# Patient Record
Sex: FEMALE | Race: WHITE | NOT HISPANIC OR LATINO | Employment: UNEMPLOYED | URBAN - NONMETROPOLITAN AREA
[De-identification: names, ages, dates, MRNs, and addresses within clinical notes are randomized per-mention and may not be internally consistent; named-entity substitution may affect disease eponyms.]

---

## 2017-07-18 ENCOUNTER — OFFICE VISIT (OUTPATIENT)
Dept: CARDIOLOGY | Facility: CLINIC | Age: 47
End: 2017-07-18

## 2017-07-18 VITALS
BODY MASS INDEX: 40.87 KG/M2 | OXYGEN SATURATION: 98 % | HEART RATE: 95 BPM | SYSTOLIC BLOOD PRESSURE: 157 MMHG | HEIGHT: 67 IN | WEIGHT: 260.4 LBS | DIASTOLIC BLOOD PRESSURE: 92 MMHG

## 2017-07-18 DIAGNOSIS — R07.9 CHEST PAIN, UNSPECIFIED TYPE: Primary | ICD-10-CM

## 2017-07-18 DIAGNOSIS — R06.02 SHORTNESS OF BREATH: ICD-10-CM

## 2017-07-18 DIAGNOSIS — I10 ESSENTIAL HYPERTENSION: ICD-10-CM

## 2017-07-18 PROCEDURE — 93000 ELECTROCARDIOGRAM COMPLETE: CPT | Performed by: PHYSICIAN ASSISTANT

## 2017-07-18 PROCEDURE — 99214 OFFICE O/P EST MOD 30 MIN: CPT | Performed by: PHYSICIAN ASSISTANT

## 2017-07-18 RX ORDER — BUPROPION HYDROCHLORIDE 150 MG/1
150 TABLET, EXTENDED RELEASE ORAL EVERY OTHER DAY
COMMUNITY
End: 2019-06-06

## 2017-07-18 RX ORDER — LEVOTHYROXINE SODIUM 0.1 MG/1
112 TABLET ORAL DAILY
COMMUNITY
Start: 2017-07-13

## 2017-07-18 RX ORDER — BUPROPION HYDROCHLORIDE 150 MG/1
TABLET ORAL DAILY
COMMUNITY
Start: 2017-07-17 | End: 2017-07-18

## 2017-07-18 RX ORDER — HYDROCHLOROTHIAZIDE 25 MG/1
25 TABLET ORAL DAILY
COMMUNITY
Start: 2017-07-13 | End: 2019-06-06

## 2017-07-18 RX ORDER — NITROGLYCERIN 0.4 MG/1
TABLET SUBLINGUAL
Qty: 100 TABLET | Refills: 11 | Status: SHIPPED | OUTPATIENT
Start: 2017-07-18 | End: 2019-06-06

## 2017-07-18 RX ORDER — AMITRIPTYLINE HYDROCHLORIDE 25 MG/1
TABLET, FILM COATED ORAL DAILY
COMMUNITY
Start: 2017-05-31 | End: 2017-07-18

## 2017-07-18 RX ORDER — POTASSIUM CHLORIDE 750 MG/1
10 CAPSULE, EXTENDED RELEASE ORAL DAILY
COMMUNITY
Start: 2017-06-21

## 2017-07-18 RX ORDER — IBUPROFEN 800 MG/1
TABLET ORAL 3 TIMES DAILY
COMMUNITY
Start: 2017-07-13 | End: 2019-06-06

## 2017-07-18 RX ORDER — FUROSEMIDE 20 MG/1
20 TABLET ORAL DAILY
COMMUNITY
Start: 2017-06-21 | End: 2019-07-30 | Stop reason: SDUPTHER

## 2017-07-18 RX ORDER — QUETIAPINE FUMARATE 25 MG/1
TABLET, FILM COATED ORAL DAILY
COMMUNITY
Start: 2017-07-11 | End: 2017-07-18

## 2017-07-18 RX ORDER — CLONAZEPAM 1 MG/1
TABLET ORAL DAILY
COMMUNITY
Start: 2017-05-02 | End: 2017-07-18

## 2017-07-18 NOTE — PROGRESS NOTES
Problem list     Subjective   Andres Cortez is a 47 y.o. female     Chief Complaint   Patient presents with   • Chest Pain     patient appears in office today for frequent episodes of CP ; patient states it starts in center of chest and radiates into LUE       HPI    Patient is a 47-year-old female that presents for evaluation.  She is been complaining of intermittent episodes of chest discomfort.  She describes to me that she has history of panic attacks.  She describes one day in particular that she developed discomfort.  She describes a pressure and heavy sensation with subsequent pain radiating down her left upper extremity lasting for several minutes.  Eventually resolves.  She describes moderate levels of shortness of breath when trying to exert do activity.  She denies PND orthopnea.  She does feel palpitations but denies dizziness presyncope or syncope.  Otherwise voices no complaints      Outpatient Encounter Prescriptions as of 7/18/2017   Medication Sig Dispense Refill   • aspirin 81 MG tablet Take 81 mg by mouth Daily.     • buPROPion SR (WELLBUTRIN SR) 150 MG 12 hr tablet Take 150 mg by mouth Daily.     • busPIRone (BUSPAR) 15 MG tablet Take 15 mg by mouth Daily.     • Cholecalciferol (VITAMIN D3) 08749 UNITS capsule Take 1 capsule by mouth every 7 days.     • furosemide (LASIX) 20 MG tablet 20 mg Daily.     • hydrochlorothiazide (HYDRODIURIL) 25 MG tablet 25 mg Daily.     • ibuprofen (ADVIL,MOTRIN) 800 MG tablet 3 (Three) Times a Day.     • levothyroxine (SYNTHROID, LEVOTHROID) 75 MCG tablet 75 mcg Daily.     • metoprolol tartrate (LOPRESSOR) 50 MG tablet TAKE ONE TABLET BY MOUTH THREE TIMES A DAY 90 tablet 5   • nitroglycerin (NITROSTAT) 0.4 MG SL tablet Place 5 tablets under the tongue every 5 (five) minutes as needed. Place 1 tablet under the tongue every 5 minutes for up to 3 doses as needed for chest pain. If pain persists, call 911     • potassium chloride (MICRO-K) 10 MEQ CR capsule 10 mEq  Daily.     • sertraline (ZOLOFT) 100 MG tablet Take 100 mg by mouth Daily.     • nitroglycerin (NITROSTAT) 0.4 MG SL tablet 1 under the tongue as needed for angina, may repeat q5mins for up three doses 100 tablet 11   • [DISCONTINUED] amitriptyline (ELAVIL) 25 MG tablet Daily.     • [DISCONTINUED] amLODIPine (NORVASC) 5 MG tablet Take 1 tablet by mouth daily.     • [DISCONTINUED] buPROPion XL (WELLBUTRIN XL) 150 MG 24 hr tablet Daily.     • [DISCONTINUED] clonazePAM (KlonoPIN) 1 MG tablet Daily.     • [DISCONTINUED] fexofenadine (ALLEGRA ALLERGY) 180 MG tablet Take 1 tablet by mouth daily.     • [DISCONTINUED] fluticasone (FLONASE) 50 MCG/ACT nasal spray into each nostril.     • [DISCONTINUED] hydrochlorothiazide (MICROZIDE) 12.5 MG capsule Take 25 mg by mouth daily.     • [DISCONTINUED] lisinopril (PRINIVIL,ZESTRIL) 40 MG tablet Take 1 tablet by mouth daily.     • [DISCONTINUED] QUEtiapine (SEROquel) 25 MG tablet Daily.       No facility-administered encounter medications on file as of 7/18/2017.        Review of patient's allergies indicates no known allergies.    Past Medical History:   Diagnosis Date   • Anxiety    • Depression    • Hypertension    • Panic attacks        Social History     Social History   • Marital status:      Spouse name: N/A   • Number of children: N/A   • Years of education: N/A     Occupational History   • Not on file.     Social History Main Topics   • Smoking status: Former Smoker   • Smokeless tobacco: Never Used   • Alcohol use No   • Drug use: No   • Sexual activity: Defer     Other Topics Concern   • Not on file     Social History Narrative       Family History   Problem Relation Age of Onset   • Stroke Mother    • Heart attack Mother    • Heart failure Father    • Diabetes Father    • Heart failure Brother        Review of Systems   Constitutional: Negative.  Negative for fatigue.   HENT: Negative.    Eyes: Positive for visual disturbance (wears reading glasses).  "  Respiratory: Positive for chest tightness (occasional chest tightness; due to anxiety) and shortness of breath (on exertion). Negative for cough and wheezing.    Cardiovascular: Positive for chest pain (occasional CP; starts in center of chest and radiates into LUE; pt doesnt know if it is anxiety relate jossy not), palpitations (occasional flutters) and leg swelling (BLE swelling ; LLE worse than RLE).   Gastrointestinal: Negative.  Negative for abdominal pain, nausea and vomiting.   Endocrine: Negative.  Negative for cold intolerance, heat intolerance, polyphagia and polyuria.   Genitourinary: Negative.  Negative for difficulty urinating, frequency and urgency.   Musculoskeletal: Negative.  Negative for arthralgias, back pain, myalgias, neck pain and neck stiffness.   Skin: Negative.  Negative for rash and wound.   Allergic/Immunologic: Negative.  Negative for environmental allergies and food allergies.   Neurological: Negative.  Negative for dizziness, weakness, light-headedness and headaches.   Hematological: Negative.  Does not bruise/bleed easily.   Psychiatric/Behavioral: Positive for agitation (easily agitated) and confusion (easily confused). Negative for sleep disturbance. The patient is nervous/anxious (H/O anxiety disorder).        Objective     /92 (BP Location: Left arm, Patient Position: Sitting)  Pulse 95  Ht 67\" (170.2 cm)  Wt 260 lb 6.4 oz (118 kg)  SpO2 98%  BMI 40.78 kg/m2    Lab Results (most recent)     None          Physical Exam   Constitutional: She is oriented to person, place, and time. She appears well-developed and well-nourished. No distress.   HENT:   Head: Normocephalic and atraumatic.   Eyes: EOM are normal. Pupils are equal, round, and reactive to light.   Neck: No JVD present.   Cardiovascular: Normal rate, regular rhythm and normal heart sounds.  Exam reveals no gallop and no friction rub.    No murmur heard.  Pulmonary/Chest: Effort normal and breath sounds normal. No " respiratory distress. She has no wheezes. She has no rales. She exhibits no tenderness.   Abdominal: Soft.   Musculoskeletal: Normal range of motion. She exhibits no edema.   Neurological: She is alert and oriented to person, place, and time. No cranial nerve deficit.   Skin: Skin is warm and dry. No rash noted. No erythema. No pallor.   Psychiatric: She has a normal mood and affect. Her behavior is normal.   Nursing note and vitals reviewed.      Procedure     ECG 12 Lead  Date/Time: 7/18/2017 10:38 AM  Performed by: MARIA M BALDWIN  Authorized by: MARIA M BALDWIN   Comments:     EKG demonstrates sinus rhythm at 91 bpm, normal axis, early precordial R-wave progression, no acute ST changes               Assessment/Plan     Problems Addressed this Visit        Cardiovascular and Mediastinum    Essential hypertension    Relevant Medications    furosemide (LASIX) 20 MG tablet    hydrochlorothiazide (HYDRODIURIL) 25 MG tablet    Other Relevant Orders    Stress Test With Myocardial Perfusion One Day    Adult Transthoracic Echo Complete       Respiratory    Shortness of breath    Relevant Orders    Stress Test With Myocardial Perfusion One Day    Adult Transthoracic Echo Complete       Nervous and Auditory    Chest pain - Primary    Relevant Orders    ECG 12 Lead    Stress Test With Myocardial Perfusion One Day    Adult Transthoracic Echo Complete              Recommendations  1.  Because of symptom of chest pain with symptoms concerning for ischemia, we'll schedule for stress test and echocardiogram.  We are prescribing nitroglycerin as needed for chest discomfort.  Would like to see her back for follow-up of above testing.  Follow-up primary as scheduled

## 2017-08-10 ENCOUNTER — OUTSIDE FACILITY SERVICE (OUTPATIENT)
Dept: CARDIOLOGY | Facility: CLINIC | Age: 47
End: 2017-08-10

## 2017-08-10 ENCOUNTER — HOSPITAL ENCOUNTER (OUTPATIENT)
Dept: CARDIOLOGY | Facility: HOSPITAL | Age: 47
Discharge: HOME | End: 2017-08-10

## 2017-08-10 LAB
MAXIMAL PREDICTED HEART RATE: 172 BPM
STRESS TARGET HR: 146 BPM

## 2017-08-10 PROCEDURE — 93018 CV STRESS TEST I&R ONLY: CPT | Performed by: INTERNAL MEDICINE

## 2017-08-10 PROCEDURE — 93306 TTE W/DOPPLER COMPLETE: CPT | Performed by: INTERNAL MEDICINE

## 2017-08-10 PROCEDURE — 93017 CV STRESS TEST TRACING ONLY: CPT

## 2017-08-10 PROCEDURE — 25010000002 REGADENOSON 0.4 MG/5ML SOLUTION: Performed by: INTERNAL MEDICINE

## 2017-08-10 PROCEDURE — 78452 HT MUSCLE IMAGE SPECT MULT: CPT | Performed by: INTERNAL MEDICINE

## 2017-08-10 PROCEDURE — 0 TECHNETIUM SESTAMIBI: Performed by: INTERNAL MEDICINE

## 2017-08-10 PROCEDURE — 78452 HT MUSCLE IMAGE SPECT MULT: CPT

## 2017-08-10 PROCEDURE — 93306 TTE W/DOPPLER COMPLETE: CPT

## 2017-08-10 PROCEDURE — A9500 TC99M SESTAMIBI: HCPCS | Performed by: INTERNAL MEDICINE

## 2017-08-10 RX ADMIN — TECHNETIUM TC-99M SESTAMIBI 1 DOSE: 1 INJECTION INTRAVENOUS at 08:45

## 2017-08-10 RX ADMIN — REGADENOSON 0.4 MG: 0.08 INJECTION, SOLUTION INTRAVENOUS at 08:45

## 2017-08-16 ENCOUNTER — DOCUMENTATION (OUTPATIENT)
Dept: CARDIOLOGY | Facility: CLINIC | Age: 47
End: 2017-08-16

## 2017-08-16 NOTE — PROGRESS NOTES
Stress test and echo results back in the office, 1-2 week f/u recommended per stress test results. Message's have been left twice at [phone] number with no return call. Tried calling [phone] also this am and v/m box was not set up. Results given to KORIN Tran to mail results and appt. FLORY SHIRLEY

## 2017-08-23 ENCOUNTER — OFFICE VISIT (OUTPATIENT)
Dept: CARDIOLOGY | Facility: CLINIC | Age: 47
End: 2017-08-23

## 2017-08-23 VITALS
OXYGEN SATURATION: 97 % | HEART RATE: 82 BPM | WEIGHT: 257.8 LBS | BODY MASS INDEX: 40.46 KG/M2 | HEIGHT: 67 IN | DIASTOLIC BLOOD PRESSURE: 88 MMHG | SYSTOLIC BLOOD PRESSURE: 138 MMHG

## 2017-08-23 DIAGNOSIS — R94.39 ABNORMAL STRESS TEST: ICD-10-CM

## 2017-08-23 DIAGNOSIS — R60.0 EDEMA OF LOWER EXTREMITY, UNSPECIFIED LATERALITY: ICD-10-CM

## 2017-08-23 DIAGNOSIS — R06.02 SHORTNESS OF BREATH: ICD-10-CM

## 2017-08-23 DIAGNOSIS — R07.9 CHEST PAIN, UNSPECIFIED TYPE: Primary | ICD-10-CM

## 2017-08-23 PROCEDURE — 99214 OFFICE O/P EST MOD 30 MIN: CPT | Performed by: PHYSICIAN ASSISTANT

## 2017-08-23 RX ORDER — ISOSORBIDE MONONITRATE 30 MG/1
30 TABLET, EXTENDED RELEASE ORAL EVERY MORNING
Qty: 30 TABLET | Refills: 11 | Status: SHIPPED | OUTPATIENT
Start: 2017-08-23 | End: 2019-06-06

## 2017-08-23 RX ORDER — CLOPIDOGREL BISULFATE 75 MG/1
75 TABLET ORAL DAILY
Qty: 30 TABLET | Refills: 11 | Status: SHIPPED | OUTPATIENT
Start: 2017-08-23 | End: 2019-06-06

## 2017-08-23 RX ORDER — ATORVASTATIN CALCIUM 40 MG/1
40 TABLET, FILM COATED ORAL DAILY
Qty: 30 TABLET | Refills: 11 | Status: SHIPPED | OUTPATIENT
Start: 2017-08-23 | End: 2019-06-06

## 2017-08-23 NOTE — PROGRESS NOTES
Problem list     Subjective   Andres Cortez is a 47 y.o. female     Chief Complaint   Patient presents with   • Hypertension     presents as a follow up    • Results     patient is here to recieve testing results        HPI    Problem list  1.  Chest pain  1.1 stress test August 2017 demonstrated anterior ischemia with preserved LV function  1.2 patient with continued pain on anti-anginal therapy (beta blocker) and frequent nitroglycerin use  2.  Preserved systolic function  3.  Mild to moderate tricuspid regurgitation and mild mitral regurgitation  4.  Hypertension  5.  Dyslipidemia  6.  Family history of premature coronary artery disease including one sibling  7.  Obesity  8.  Hypothyroidism  9.  Chronic lower extremity edema    Patient is a 47-year-old female that presents back to office for follow-up.  Patient describes having significant episodes of angina.  She describes being at work.  She will develop chest discomfort that she describes as a pressure that radiates to her left upper extremity and is associated with nausea and diaphoresis.  She states one out of work her pain cause her to take nitroglycerin and eventually resolved after an extended period of time.    She describes mild to moderate dyspnea when she exerts he tries to do activity.  This has been significant per report.  She denies PND orthopnea.    She doesn't palpitate but on occasion.  She has no dizziness presyncope or syncope.  Otherwise is doing well    Stress test and echocardiogram results with pertinent positives as above      Outpatient Encounter Prescriptions as of 8/23/2017   Medication Sig Dispense Refill   • aspirin 81 MG tablet Take 1 tablet by mouth Daily. 30 tablet 6   • buPROPion SR (WELLBUTRIN SR) 150 MG 12 hr tablet Take 150 mg by mouth Daily.     • busPIRone (BUSPAR) 15 MG tablet Take 15 mg by mouth Daily.     • Cholecalciferol (VITAMIN D3) 74305 UNITS capsule Take 1 capsule by mouth every 7 days.     • furosemide (LASIX) 20  MG tablet 20 mg Daily.     • hydrochlorothiazide (HYDRODIURIL) 25 MG tablet 25 mg Daily.     • ibuprofen (ADVIL,MOTRIN) 800 MG tablet 3 (Three) Times a Day.     • levothyroxine (SYNTHROID, LEVOTHROID) 75 MCG tablet 75 mcg Daily.     • metoprolol tartrate (LOPRESSOR) 50 MG tablet TAKE ONE TABLET BY MOUTH THREE TIMES A DAY 90 tablet 5   • nitroglycerin (NITROSTAT) 0.4 MG SL tablet Place 5 tablets under the tongue every 5 (five) minutes as needed. Place 1 tablet under the tongue every 5 minutes for up to 3 doses as needed for chest pain. If pain persists, call 911     • nitroglycerin (NITROSTAT) 0.4 MG SL tablet 1 under the tongue as needed for angina, may repeat q5mins for up three doses 100 tablet 11   • potassium chloride (MICRO-K) 10 MEQ CR capsule 10 mEq Daily.     • sertraline (ZOLOFT) 100 MG tablet Take 100 mg by mouth Daily.     • [DISCONTINUED] aspirin 81 MG tablet Take 81 mg by mouth Daily.     • atorvastatin (LIPITOR) 40 MG tablet Take 1 tablet by mouth Daily. 30 tablet 11   • clopidogrel (PLAVIX) 75 MG tablet Take 1 tablet by mouth Daily. 30 tablet 11   • isosorbide mononitrate (IMDUR) 30 MG 24 hr tablet Take 1 tablet by mouth Every Morning. 30 tablet 11     No facility-administered encounter medications on file as of 8/23/2017.        Review of patient's allergies indicates no known allergies.    Past Medical History:   Diagnosis Date   • Anxiety    • Depression    • Hypertension    • Panic attacks        Social History     Social History   • Marital status:      Spouse name: N/A   • Number of children: N/A   • Years of education: N/A     Occupational History   • Not on file.     Social History Main Topics   • Smoking status: Former Smoker   • Smokeless tobacco: Never Used   • Alcohol use No   • Drug use: No   • Sexual activity: Defer     Other Topics Concern   • Not on file     Social History Narrative       Family History   Problem Relation Age of Onset   • Stroke Mother    • Heart attack Mother   "  • Heart failure Father    • Diabetes Father    • Heart failure Brother        Review of Systems   Constitutional: Negative.    Eyes: Negative.    Respiratory: Positive for shortness of breath.    Cardiovascular: Positive for chest pain, palpitations and leg swelling ( leg and arm swelling).   Gastrointestinal: Negative.    Endocrine: Negative.    Genitourinary: Negative.    Musculoskeletal: Negative.    Skin: Negative.    Allergic/Immunologic: Negative.    Neurological: Positive for headaches.   Hematological: Negative.    Psychiatric/Behavioral: The patient is nervous/anxious.        Objective     /88 (BP Location: Left arm, Patient Position: Sitting)  Pulse 82  Ht 67\" (170.2 cm)  Wt 257 lb 12.8 oz (117 kg)  SpO2 97%  BMI 40.38 kg/m2    Lab Results (most recent)     None          Physical Exam   Constitutional: She is oriented to person, place, and time. She appears well-developed and well-nourished. No distress.   HENT:   Head: Normocephalic and atraumatic.   Eyes: EOM are normal. Pupils are equal, round, and reactive to light. No scleral icterus.   Neck: No JVD present.   Cardiovascular: Normal rate, regular rhythm and normal heart sounds.  Exam reveals no gallop and no friction rub.    No murmur heard.  Pulmonary/Chest: Effort normal and breath sounds normal. No respiratory distress. She has no wheezes. She has no rales. She exhibits no tenderness.   Abdominal: Soft.   Musculoskeletal: Normal range of motion. She exhibits no edema.   Neurological: She is alert and oriented to person, place, and time. No cranial nerve deficit.   Skin: Skin is warm and dry. No rash noted. No erythema. No pallor.   Psychiatric: She has a normal mood and affect. Her behavior is normal.   Nursing note and vitals reviewed.      Procedure   Procedures       Assessment/Plan     Problems Addressed this Visit        Cardiovascular and Mediastinum    Abnormal stress test    Relevant Orders    Basic Metabolic Panel    Cardiac " catheterization       Respiratory    Shortness of breath    Relevant Orders    Basic Metabolic Panel    Cardiac catheterization       Nervous and Auditory    Chest pain - Primary    Relevant Orders    Basic Metabolic Panel    Cardiac catheterization       Other    Edema of lower extremity    Relevant Orders    Basic Metabolic Panel    Cardiac catheterization              Recommendation  1.  Patient is having class III levels of angina that have limited her in regards to exertion.  She is already on antianginal therapy with significant pain and frequent nitroglycerin use.  She has stress test demonstrating anterior ischemia.  She has significant risk factors for coronary artery disease including obesity, hypertension, dyslipidemia, and family history of premature coronary artery disease.  Therefore because of progressive low-level symptoms, failure of guidelines directed therapy, and anterior ischemia noted on stress testing, catheterization will be scheduled.  2.  We will empirically treat for ischemia by placing her on dual antiplatelet therapy.  Statin therapy will be started as well as well as antianginal therapy.  We would like to start nitrates in addition to her beta blocker.  3.  We will check a BMP in approximately 2 weeks that she is taking Lasix daily.  4.  We will see her back for follow-up after testing.  Any chest pain not resolved by nitroglycerin, she is to go to the emergency room.

## 2017-08-30 ENCOUNTER — OUTSIDE FACILITY SERVICE (OUTPATIENT)
Dept: CARDIOLOGY | Facility: CLINIC | Age: 47
End: 2017-08-30

## 2017-08-30 PROCEDURE — 93458 L HRT ARTERY/VENTRICLE ANGIO: CPT | Performed by: INTERNAL MEDICINE

## 2019-06-06 ENCOUNTER — APPOINTMENT (OUTPATIENT)
Dept: CT IMAGING | Facility: HOSPITAL | Age: 49
End: 2019-06-06

## 2019-06-06 ENCOUNTER — HOSPITAL ENCOUNTER (OUTPATIENT)
Facility: HOSPITAL | Age: 49
Setting detail: OBSERVATION
Discharge: HOME | End: 2019-06-07
Attending: EMERGENCY MEDICINE | Admitting: INTERNAL MEDICINE

## 2019-06-06 ENCOUNTER — APPOINTMENT (OUTPATIENT)
Dept: MRI IMAGING | Facility: HOSPITAL | Age: 49
End: 2019-06-06

## 2019-06-06 ENCOUNTER — APPOINTMENT (OUTPATIENT)
Dept: GENERAL RADIOLOGY | Facility: HOSPITAL | Age: 49
End: 2019-06-06

## 2019-06-06 DIAGNOSIS — R42 VERTIGO: ICD-10-CM

## 2019-06-06 DIAGNOSIS — G56.03 BILATERAL CARPAL TUNNEL SYNDROME: ICD-10-CM

## 2019-06-06 DIAGNOSIS — R11.2 INTRACTABLE VOMITING WITH NAUSEA, UNSPECIFIED VOMITING TYPE: Primary | ICD-10-CM

## 2019-06-06 DIAGNOSIS — Z74.09 IMPAIRED MOBILITY AND ADLS: ICD-10-CM

## 2019-06-06 DIAGNOSIS — Z78.9 IMPAIRED MOBILITY AND ADLS: ICD-10-CM

## 2019-06-06 DIAGNOSIS — R51.9 NONINTRACTABLE HEADACHE, UNSPECIFIED CHRONICITY PATTERN, UNSPECIFIED HEADACHE TYPE: ICD-10-CM

## 2019-06-06 PROBLEM — Z87.891 FORMER TOBACCO USE: Status: ACTIVE | Noted: 2019-06-06

## 2019-06-06 PROBLEM — E07.9 DISEASE OF THYROID GLAND: Status: ACTIVE | Noted: 2019-06-06

## 2019-06-06 LAB
ALBUMIN SERPL-MCNC: 4.1 G/DL (ref 3.5–5.2)
ALBUMIN/GLOB SERPL: 1.1 G/DL
ALP SERPL-CCNC: 87 U/L (ref 39–117)
ALT SERPL W P-5'-P-CCNC: 19 U/L (ref 1–33)
ANION GAP SERPL CALCULATED.3IONS-SCNC: 13 MMOL/L
AST SERPL-CCNC: 21 U/L (ref 1–32)
BACTERIA UR QL AUTO: ABNORMAL /HPF
BASOPHILS # BLD AUTO: 0.05 10*3/MM3 (ref 0–0.2)
BASOPHILS NFR BLD AUTO: 0.4 % (ref 0–1.5)
BILIRUB SERPL-MCNC: 0.2 MG/DL (ref 0.2–1.2)
BILIRUB UR QL STRIP: NEGATIVE
BUN BLD-MCNC: 17 MG/DL (ref 6–20)
BUN/CREAT SERPL: 20.5 (ref 7–25)
CALCIUM SPEC-SCNC: 9.5 MG/DL (ref 8.6–10.5)
CHLORIDE SERPL-SCNC: 102 MMOL/L (ref 98–107)
CLARITY UR: ABNORMAL
CO2 SERPL-SCNC: 26 MMOL/L (ref 22–29)
COLOR UR: YELLOW
CREAT BLD-MCNC: 0.83 MG/DL (ref 0.57–1)
DEPRECATED RDW RBC AUTO: 47.1 FL (ref 37–54)
EOSINOPHIL # BLD AUTO: 0.24 10*3/MM3 (ref 0–0.4)
EOSINOPHIL NFR BLD AUTO: 1.8 % (ref 0.3–6.2)
ERYTHROCYTE [DISTWIDTH] IN BLOOD BY AUTOMATED COUNT: 14.7 % (ref 12.3–15.4)
GFR SERPL CREATININE-BSD FRML MDRD: 73 ML/MIN/1.73
GLOBULIN UR ELPH-MCNC: 3.7 GM/DL
GLUCOSE BLD-MCNC: 115 MG/DL (ref 65–99)
GLUCOSE BLDC GLUCOMTR-MCNC: 75 MG/DL (ref 70–130)
GLUCOSE BLDC GLUCOMTR-MCNC: 80 MG/DL (ref 70–130)
GLUCOSE UR STRIP-MCNC: NEGATIVE MG/DL
HCT VFR BLD AUTO: 39.8 % (ref 34–46.6)
HGB BLD-MCNC: 13 G/DL (ref 12–15.9)
HGB UR QL STRIP.AUTO: NEGATIVE
HOLD SPECIMEN: NORMAL
HOLD SPECIMEN: NORMAL
HYALINE CASTS UR QL AUTO: ABNORMAL /LPF
IMM GRANULOCYTES # BLD AUTO: 0.16 10*3/MM3 (ref 0–0.05)
IMM GRANULOCYTES NFR BLD AUTO: 1.2 % (ref 0–0.5)
KETONES UR QL STRIP: NEGATIVE
LEUKOCYTE ESTERASE UR QL STRIP.AUTO: ABNORMAL
LYMPHOCYTES # BLD AUTO: 2.09 10*3/MM3 (ref 0.7–3.1)
LYMPHOCYTES NFR BLD AUTO: 15.4 % (ref 19.6–45.3)
MAGNESIUM SERPL-MCNC: 2 MG/DL (ref 1.6–2.6)
MCH RBC QN AUTO: 28.6 PG (ref 26.6–33)
MCHC RBC AUTO-ENTMCNC: 32.7 G/DL (ref 31.5–35.7)
MCV RBC AUTO: 87.7 FL (ref 79–97)
MONOCYTES # BLD AUTO: 1 10*3/MM3 (ref 0.1–0.9)
MONOCYTES NFR BLD AUTO: 7.4 % (ref 5–12)
MUCOUS THREADS URNS QL MICRO: ABNORMAL /HPF
NEUTROPHILS # BLD AUTO: 10.16 10*3/MM3 (ref 1.7–7)
NEUTROPHILS NFR BLD AUTO: 75 % (ref 42.7–76)
NITRITE UR QL STRIP: NEGATIVE
NT-PROBNP SERPL-MCNC: 93.9 PG/ML (ref 5–900)
PH UR STRIP.AUTO: 5.5 [PH] (ref 5–8)
PLATELET # BLD AUTO: 417 10*3/MM3 (ref 140–450)
PMV BLD AUTO: 9.5 FL (ref 6–12)
POTASSIUM BLD-SCNC: 3.9 MMOL/L (ref 3.5–5.2)
PROT SERPL-MCNC: 7.8 G/DL (ref 6–8.5)
PROT UR QL STRIP: ABNORMAL
RBC # BLD AUTO: 4.54 10*6/MM3 (ref 3.77–5.28)
RBC # UR: ABNORMAL /HPF
REF LAB TEST METHOD: ABNORMAL
SODIUM BLD-SCNC: 141 MMOL/L (ref 136–145)
SP GR UR STRIP: 1.02 (ref 1–1.03)
SQUAMOUS #/AREA URNS HPF: ABNORMAL /HPF
TROPONIN T SERPL-MCNC: <0.01 NG/ML (ref 0–0.03)
TSH SERPL DL<=0.05 MIU/L-ACNC: 6.28 MIU/ML (ref 0.27–4.2)
UROBILINOGEN UR QL STRIP: ABNORMAL
WBC NRBC COR # BLD: 13.54 10*3/MM3 (ref 3.4–10.8)
WBC UR QL AUTO: ABNORMAL /HPF
WHOLE BLOOD HOLD SPECIMEN: NORMAL
WHOLE BLOOD HOLD SPECIMEN: NORMAL

## 2019-06-06 PROCEDURE — 83735 ASSAY OF MAGNESIUM: CPT | Performed by: EMERGENCY MEDICINE

## 2019-06-06 PROCEDURE — 84484 ASSAY OF TROPONIN QUANT: CPT | Performed by: EMERGENCY MEDICINE

## 2019-06-06 PROCEDURE — 99220 PR INITIAL OBSERVATION CARE/DAY 70 MINUTES: CPT | Performed by: FAMILY MEDICINE

## 2019-06-06 PROCEDURE — 96374 THER/PROPH/DIAG INJ IV PUSH: CPT

## 2019-06-06 PROCEDURE — 87086 URINE CULTURE/COLONY COUNT: CPT | Performed by: NURSE PRACTITIONER

## 2019-06-06 PROCEDURE — 70553 MRI BRAIN STEM W/O & W/DYE: CPT

## 2019-06-06 PROCEDURE — 96376 TX/PRO/DX INJ SAME DRUG ADON: CPT

## 2019-06-06 PROCEDURE — A9577 INJ MULTIHANCE: HCPCS | Performed by: EMERGENCY MEDICINE

## 2019-06-06 PROCEDURE — 25010000002 FUROSEMIDE PER 20 MG: Performed by: FAMILY MEDICINE

## 2019-06-06 PROCEDURE — 85025 COMPLETE CBC W/AUTO DIFF WBC: CPT

## 2019-06-06 PROCEDURE — 82962 GLUCOSE BLOOD TEST: CPT

## 2019-06-06 PROCEDURE — 70450 CT HEAD/BRAIN W/O DYE: CPT

## 2019-06-06 PROCEDURE — 25010000002 ONDANSETRON PER 1 MG: Performed by: FAMILY MEDICINE

## 2019-06-06 PROCEDURE — 99244 OFF/OP CNSLTJ NEW/EST MOD 40: CPT | Performed by: PSYCHIATRY & NEUROLOGY

## 2019-06-06 PROCEDURE — 81001 URINALYSIS AUTO W/SCOPE: CPT | Performed by: EMERGENCY MEDICINE

## 2019-06-06 PROCEDURE — 84443 ASSAY THYROID STIM HORMONE: CPT | Performed by: NURSE PRACTITIONER

## 2019-06-06 PROCEDURE — 0 IOPAMIDOL PER 1 ML: Performed by: FAMILY MEDICINE

## 2019-06-06 PROCEDURE — 74176 CT ABD & PELVIS W/O CONTRAST: CPT

## 2019-06-06 PROCEDURE — 70496 CT ANGIOGRAPHY HEAD: CPT

## 2019-06-06 PROCEDURE — 71045 X-RAY EXAM CHEST 1 VIEW: CPT

## 2019-06-06 PROCEDURE — G0378 HOSPITAL OBSERVATION PER HR: HCPCS

## 2019-06-06 PROCEDURE — 25010000002 PROMETHAZINE PER 50 MG: Performed by: NURSE PRACTITIONER

## 2019-06-06 PROCEDURE — 96375 TX/PRO/DX INJ NEW DRUG ADDON: CPT

## 2019-06-06 PROCEDURE — 80053 COMPREHEN METABOLIC PANEL: CPT | Performed by: EMERGENCY MEDICINE

## 2019-06-06 PROCEDURE — 83880 ASSAY OF NATRIURETIC PEPTIDE: CPT | Performed by: NURSE PRACTITIONER

## 2019-06-06 PROCEDURE — 93005 ELECTROCARDIOGRAM TRACING: CPT | Performed by: EMERGENCY MEDICINE

## 2019-06-06 PROCEDURE — 0 GADOBENATE DIMEGLUMINE 529 MG/ML SOLUTION: Performed by: EMERGENCY MEDICINE

## 2019-06-06 PROCEDURE — 99285 EMERGENCY DEPT VISIT HI MDM: CPT

## 2019-06-06 PROCEDURE — 93005 ELECTROCARDIOGRAM TRACING: CPT

## 2019-06-06 PROCEDURE — 70498 CT ANGIOGRAPHY NECK: CPT

## 2019-06-06 RX ORDER — CLONIDINE HYDROCHLORIDE 0.1 MG/1
.05-.1 TABLET ORAL 2 TIMES DAILY
COMMUNITY

## 2019-06-06 RX ORDER — FUROSEMIDE 20 MG/1
20 TABLET ORAL DAILY
Status: DISCONTINUED | OUTPATIENT
Start: 2019-06-07 | End: 2019-06-07 | Stop reason: HOSPADM

## 2019-06-06 RX ORDER — PROMETHAZINE HYDROCHLORIDE 25 MG/ML
12.5 INJECTION, SOLUTION INTRAMUSCULAR; INTRAVENOUS ONCE
Status: COMPLETED | OUTPATIENT
Start: 2019-06-06 | End: 2019-06-06

## 2019-06-06 RX ORDER — FUROSEMIDE 10 MG/ML
40 INJECTION INTRAMUSCULAR; INTRAVENOUS ONCE
Status: COMPLETED | OUTPATIENT
Start: 2019-06-06 | End: 2019-06-06

## 2019-06-06 RX ORDER — LEVOTHYROXINE SODIUM 0.07 MG/1
75 TABLET ORAL DAILY
Status: CANCELLED | OUTPATIENT
Start: 2019-06-06

## 2019-06-06 RX ORDER — MECLIZINE HYDROCHLORIDE 25 MG/1
25 TABLET ORAL ONCE
Status: COMPLETED | OUTPATIENT
Start: 2019-06-06 | End: 2019-06-06

## 2019-06-06 RX ORDER — LEVOTHYROXINE SODIUM 0.1 MG/1
100 TABLET ORAL
Status: DISCONTINUED | OUTPATIENT
Start: 2019-06-07 | End: 2019-06-07 | Stop reason: HOSPADM

## 2019-06-06 RX ORDER — SODIUM CHLORIDE 0.9 % (FLUSH) 0.9 %
10 SYRINGE (ML) INJECTION AS NEEDED
Status: DISCONTINUED | OUTPATIENT
Start: 2019-06-06 | End: 2019-06-07 | Stop reason: HOSPADM

## 2019-06-06 RX ORDER — BUPROPION HYDROCHLORIDE 150 MG/1
150 TABLET ORAL EVERY OTHER DAY
Status: DISCONTINUED | OUTPATIENT
Start: 2019-06-07 | End: 2019-06-07 | Stop reason: HOSPADM

## 2019-06-06 RX ORDER — ASPIRIN 81 MG/1
81 TABLET, CHEWABLE ORAL DAILY
Status: DISCONTINUED | OUTPATIENT
Start: 2019-06-07 | End: 2019-06-07 | Stop reason: HOSPADM

## 2019-06-06 RX ORDER — ATORVASTATIN CALCIUM 40 MG/1
80 TABLET, FILM COATED ORAL NIGHTLY
Status: DISCONTINUED | OUTPATIENT
Start: 2019-06-06 | End: 2019-06-07 | Stop reason: HOSPADM

## 2019-06-06 RX ORDER — METOPROLOL SUCCINATE 50 MG/1
50 TABLET, EXTENDED RELEASE ORAL NIGHTLY
COMMUNITY

## 2019-06-06 RX ORDER — ENALAPRILAT 2.5 MG/2ML
0.62 INJECTION INTRAVENOUS EVERY 6 HOURS PRN
Status: DISCONTINUED | OUTPATIENT
Start: 2019-06-06 | End: 2019-06-07 | Stop reason: HOSPADM

## 2019-06-06 RX ORDER — OLMESARTAN MEDOXOMIL AND HYDROCHLOROTHIAZIDE 40/25 40; 25 MG/1; MG/1
1 TABLET ORAL DAILY
COMMUNITY
End: 2021-01-06 | Stop reason: ALTCHOICE

## 2019-06-06 RX ORDER — METOPROLOL SUCCINATE 50 MG/1
50 TABLET, EXTENDED RELEASE ORAL DAILY
Status: DISCONTINUED | OUTPATIENT
Start: 2019-06-06 | End: 2019-06-07 | Stop reason: HOSPADM

## 2019-06-06 RX ORDER — METOPROLOL TARTRATE 50 MG/1
50 TABLET, FILM COATED ORAL 3 TIMES DAILY
Status: DISCONTINUED | OUTPATIENT
Start: 2019-06-06 | End: 2019-06-06 | Stop reason: SDUPTHER

## 2019-06-06 RX ORDER — ONDANSETRON 2 MG/ML
4 INJECTION INTRAMUSCULAR; INTRAVENOUS EVERY 6 HOURS PRN
Status: DISCONTINUED | OUTPATIENT
Start: 2019-06-06 | End: 2019-06-07 | Stop reason: HOSPADM

## 2019-06-06 RX ORDER — BUPROPION HYDROCHLORIDE 150 MG/1
150 TABLET, EXTENDED RELEASE ORAL EVERY OTHER DAY
Status: DISCONTINUED | OUTPATIENT
Start: 2019-06-06 | End: 2019-06-06 | Stop reason: SDUPTHER

## 2019-06-06 RX ORDER — SODIUM CHLORIDE, SODIUM LACTATE, POTASSIUM CHLORIDE, CALCIUM CHLORIDE 600; 310; 30; 20 MG/100ML; MG/100ML; MG/100ML; MG/100ML
125 INJECTION, SOLUTION INTRAVENOUS CONTINUOUS
Status: DISCONTINUED | OUTPATIENT
Start: 2019-06-06 | End: 2019-06-07 | Stop reason: HOSPADM

## 2019-06-06 RX ORDER — ASPIRIN 325 MG
325 TABLET ORAL ONCE
Status: COMPLETED | OUTPATIENT
Start: 2019-06-06 | End: 2019-06-06

## 2019-06-06 RX ORDER — SODIUM CHLORIDE 0.9 % (FLUSH) 0.9 %
3 SYRINGE (ML) INJECTION EVERY 12 HOURS SCHEDULED
Status: DISCONTINUED | OUTPATIENT
Start: 2019-06-06 | End: 2019-06-07 | Stop reason: HOSPADM

## 2019-06-06 RX ORDER — METOPROLOL TARTRATE 50 MG/1
50 TABLET, FILM COATED ORAL EVERY MORNING
COMMUNITY
End: 2019-06-06

## 2019-06-06 RX ORDER — MECLIZINE HYDROCHLORIDE 25 MG/1
25 TABLET ORAL 3 TIMES DAILY PRN
Status: DISCONTINUED | OUTPATIENT
Start: 2019-06-06 | End: 2019-06-07 | Stop reason: HOSPADM

## 2019-06-06 RX ORDER — ACETAMINOPHEN 325 MG/1
650 TABLET ORAL EVERY 4 HOURS PRN
Status: DISCONTINUED | OUTPATIENT
Start: 2019-06-06 | End: 2019-06-07 | Stop reason: HOSPADM

## 2019-06-06 RX ORDER — OLMESARTAN MEDOXOMIL 5 MG/1
5 TABLET ORAL DAILY
COMMUNITY
End: 2019-06-06

## 2019-06-06 RX ORDER — BUPROPION HYDROCHLORIDE 150 MG/1
150 TABLET ORAL EVERY MORNING
COMMUNITY
End: 2021-01-06 | Stop reason: ALTCHOICE

## 2019-06-06 RX ORDER — SODIUM CHLORIDE 0.9 % (FLUSH) 0.9 %
3-10 SYRINGE (ML) INJECTION AS NEEDED
Status: DISCONTINUED | OUTPATIENT
Start: 2019-06-06 | End: 2019-06-07 | Stop reason: HOSPADM

## 2019-06-06 RX ADMIN — SERTRALINE HYDROCHLORIDE 50 MG: 50 TABLET ORAL at 18:32

## 2019-06-06 RX ADMIN — METOPROLOL SUCCINATE 50 MG: 50 TABLET, EXTENDED RELEASE ORAL at 18:32

## 2019-06-06 RX ADMIN — GADOBENATE DIMEGLUMINE 20 ML: 529 INJECTION, SOLUTION INTRAVENOUS at 12:03

## 2019-06-06 RX ADMIN — SODIUM CHLORIDE, POTASSIUM CHLORIDE, SODIUM LACTATE AND CALCIUM CHLORIDE 125 ML/HR: 600; 310; 30; 20 INJECTION, SOLUTION INTRAVENOUS at 18:32

## 2019-06-06 RX ADMIN — PROMETHAZINE HYDROCHLORIDE 12.5 MG: 25 INJECTION INTRAMUSCULAR; INTRAVENOUS at 07:43

## 2019-06-06 RX ADMIN — BUSPIRONE HYDROCHLORIDE 15 MG: 10 TABLET ORAL at 22:15

## 2019-06-06 RX ADMIN — FUROSEMIDE 40 MG: 10 INJECTION, SOLUTION INTRAMUSCULAR; INTRAVENOUS at 18:30

## 2019-06-06 RX ADMIN — SODIUM CHLORIDE 1000 ML: 9 INJECTION, SOLUTION INTRAVENOUS at 07:43

## 2019-06-06 RX ADMIN — SODIUM CHLORIDE, PRESERVATIVE FREE 3 ML: 5 INJECTION INTRAVENOUS at 22:15

## 2019-06-06 RX ADMIN — IOPAMIDOL 75 ML: 755 INJECTION, SOLUTION INTRAVENOUS at 17:58

## 2019-06-06 RX ADMIN — MECLIZINE HYDROCHLORIDE 25 MG: 25 TABLET ORAL at 10:54

## 2019-06-06 RX ADMIN — ONDANSETRON 4 MG: 2 INJECTION INTRAMUSCULAR; INTRAVENOUS at 19:23

## 2019-06-06 RX ADMIN — ATORVASTATIN CALCIUM 80 MG: 40 TABLET, FILM COATED ORAL at 22:15

## 2019-06-06 RX ADMIN — HYDROCHLOROTHIAZIDE: 25 TABLET ORAL at 18:34

## 2019-06-06 RX ADMIN — PROMETHAZINE HYDROCHLORIDE 12.5 MG: 25 INJECTION INTRAMUSCULAR; INTRAVENOUS at 13:03

## 2019-06-06 RX ADMIN — ASPIRIN 325 MG ORAL TABLET 325 MG: 325 PILL ORAL at 18:30

## 2019-06-06 NOTE — ED PROVIDER NOTES
Subjective   2 hours prior to arrival patient complains of sudden onset vertigo as well as near syncope.  Patient had just finished a night shift.  And she was due to get off at 7.  She has had several episodes of nausea vomiting and some diffuse mild abdominal pain.  She is not having any urinary symptoms.  There is no pleuritic chest pain.  However she does say she has a slight achiness to her chest..  She does not have a headache however light does hurt her eyes.  She reports turning her head left and right and getting up and walking makes her symptoms worse.  Symptoms like this before.  She tells me she has been under a lot of stress the last several months.  She  spent a month with her mother before she .  Patient tells me she has a history of lower extremity edema  but the same as usual.  They do not hurt her.  Was having a typically normal day at work until about 2 hours ago.        Dizziness   Quality:  Lightheadedness and vertigo  Severity:  Moderate  Onset quality:  Sudden  Duration:  2 hours  Timing:  Constant  Progression:  Unchanged  Chronicity:  New  Context: head movement and standing up    Relieved by:  Being still  Worsened by:  Movement, standing up and turning head  Ineffective treatments:  None tried  Associated symptoms: nausea and vomiting    Associated symptoms: no chest pain, no diarrhea and no shortness of breath        Review of Systems   Constitutional: Negative for diaphoresis and fever.   HENT: Negative for congestion and sore throat.    Respiratory: Negative for cough, choking, chest tightness, shortness of breath and wheezing.    Cardiovascular: Positive for leg swelling. Negative for chest pain.   Gastrointestinal: Positive for abdominal pain, nausea and vomiting. Negative for abdominal distention and diarrhea.   Neurological: Positive for dizziness.   All other systems reviewed and are negative.      Past Medical History:   Diagnosis Date   • Anxiety    • Depression    • Disease  of thyroid gland    • Hypertension    • Panic attacks        No Known Allergies    Past Surgical History:   Procedure Laterality Date   • BACK SURGERY     • CHOLECYSTECTOMY     • NECK SURGERY         Family History   Problem Relation Age of Onset   • Stroke Mother    • Heart attack Mother    • Heart failure Father    • Diabetes Father    • Heart failure Brother        Social History     Socioeconomic History   • Marital status:      Spouse name: Not on file   • Number of children: Not on file   • Years of education: Not on file   • Highest education level: Not on file   Tobacco Use   • Smoking status: Former Smoker   • Smokeless tobacco: Never Used   Substance and Sexual Activity   • Alcohol use: No   • Drug use: No   • Sexual activity: Defer           Objective   Physical Exam   Constitutional: She is oriented to person, place, and time. She appears well-developed and well-nourished. She appears distressed.   HENT:   Head: Normocephalic and atraumatic.   Right Ear: External ear normal.   Left Ear: External ear normal.   Nose: Nose normal.   Mouth/Throat: Oropharynx is clear and moist.   Eyes: Conjunctivae and EOM are normal. Pupils are equal, round, and reactive to light.   Neck: Normal range of motion. Neck supple.   Cardiovascular: Normal rate, regular rhythm, normal heart sounds and intact distal pulses.   Pulmonary/Chest: Effort normal and breath sounds normal.   Abdominal: Soft. Bowel sounds are normal.   Musculoskeletal: Normal range of motion. She exhibits edema. She exhibits no tenderness.   Neurological: She is alert and oriented to person, place, and time.   Skin: Skin is warm and dry.   Psychiatric: She has a normal mood and affect. Her behavior is normal. Judgment and thought content normal.       Procedures           ED Course  ED Course as of Jun 06 1354   Thu Jun 06, 2019   1319 Patient still with persistent nausea vomiting vertigo.  Her imaging at this point reassuring however she is still  able to get up walk around as she reports things are moving too much.  Need to admit for further evaluation.  Hospitalist paged.    I spoke with Dr. Russo who will admit  []      ED Course User Index  [BROOKE] Jl Love APRN          MRI Brain With & Without Contrast   Preliminary Result   No acute intracranial abnormality identified.       D:  06/06/2019   E:  06/06/2019          CT Head Without Contrast   Preliminary Result   Brain appears within normal limits for age.       D:  06/06/2019   E:  06/06/2019              CT Abdomen Pelvis Without Contrast   Preliminary Result   No acute intra-abdominal or pelvic abnormality.       D:  06/06/2019   E:  06/06/2019                  XR Chest 1 View   Final Result   No acute cardiopulmonary findings.      Signer Name: Tawanda Caputo MD    Signed: 6/6/2019 7:32 AM    Workstation Name: [id number]                    MDM      Final diagnoses:   Intractable vomiting with nausea, unspecified vomiting type   Vertigo   Nonintractable headache, unspecified chronicity pattern, unspecified headache type            Jl Love APRN  06/06/19 1354

## 2019-06-06 NOTE — PLAN OF CARE
Problem: Patient Care Overview  Goal: Plan of Care Review  Outcome: Ongoing (interventions implemented as appropriate)   06/06/19 1819   Coping/Psychosocial   Plan of Care Reviewed With patient   Plan of Care Review   Progress no change   OTHER   Outcome Summary Patient arrived from ED. VSS       Problem: Skin Injury Risk (Adult)  Goal: Identify Related Risk Factors and Signs and Symptoms  Outcome: Ongoing (interventions implemented as appropriate)   06/06/19 1819   Skin Injury Risk (Adult)   Related Risk Factors (Skin Injury Risk) mobility impaired;moisture     Goal: Skin Health and Integrity  Outcome: Ongoing (interventions implemented as appropriate)   06/06/19 1819   Skin Injury Risk (Adult)   Skin Health and Integrity making progress toward outcome       Problem: Fall Risk (Adult)  Goal: Identify Related Risk Factors and Signs and Symptoms  Outcome: Ongoing (interventions implemented as appropriate)   06/06/19 1819   Fall Risk (Adult)   Related Risk Factors (Fall Risk) environment unfamiliar;fatigue/slow reaction;history of falls;impaired vision   Signs and Symptoms (Fall Risk) presence of risk factors     Goal: Absence of Fall  Outcome: Ongoing (interventions implemented as appropriate)   06/06/19 1819   Fall Risk (Adult)   Absence of Fall making progress toward outcome

## 2019-06-06 NOTE — H&P
[facility] Medicine Services  HISTORY AND PHYSICAL    Patient Name: Andres Cortez  : 1970  MRN: [id number]  Primary Care Physician: Carlton Myers APRN    Subjective   Subjective     Chief Complaint:  Vertigo [R42]    HPI:  Andres Cortez is a 49 y.o. female with a PMHx significant for HTN, hypothyroidism, anxiety/depression, chronic BLE edema.  Presented to [facility] ED with sudden onset vertigo associated with presyncope and extreme nausea with a few bilious emesis episodes.  Symptoms started approximately 7 AM when she was due to get off of her night shift from work.  Denies any focal weakness or prodromal symptoms leading up to this event.  In ED, continued to have extreme vertigo and nausea limiting ability to ambulate and tolerate oral intake.  Initial evaluation overall unremarkable with negative CT of head without contrast and MRI.    ROS:   Otherwise complete ROS reviewed and is negative except as mentioned in the HPI.    Personal History     Past Medical History:   Diagnosis Date   • Anxiety    • Depression    • Disease of thyroid gland    • Hypertension    • Panic attacks        Past Surgical History:   Procedure Laterality Date   • BACK SURGERY     • CHOLECYSTECTOMY     • NECK SURGERY         Family History: family history includes Diabetes in her father; Heart attack in her mother; Heart failure in her brother and father; Stroke in her mother. Otherwise pertinent FHx was reviewed and unremarkable.     Social History:  reports that she has quit smoking. She has never used smokeless tobacco. She reports that she does not drink alcohol or use drugs.  Social History     Social History Narrative   • Not on file       Medications:    Available home medications reviewed.     (Not in a hospital admission)    No Known Allergies    Objective   Objective     Vital Signs:   Temp:  [97.4 °F (36.3 °C)] 97.4 °F (36.3 °C)  Heart Rate:  [] 93  Resp:  [20]  20  BP: (146-176)/() 149/104        Physical Exam:   Constitutional: No acute distress, awake, alert, nontoxic, obese body habitus  Eyes:no nystagmus with head tilt or rotation noted  Respiratory: Clear to auscultation bilaterally, good effort, nonlabored respirations   Cardiovascular: RRR, no murmur  Musculoskeletal: 1-2+ L>R peripheral edema, normal muscle tone for age  Psychiatric: Appropriate affect, good insight and judgement, cooperative  Neurologic: Oriented x 3, movements symmetric BUE and BLE, Cranial Nerves grossly intact to confrontation, speech clear and fluent      Results Reviewed:  I have personally reviewed current lab, radiology, and data and agree.    Results from last 7 days   Lab Units 06/06/19  0706   WBC 10*3/mm3 13.54*   HEMOGLOBIN g/dL 13.0   HEMATOCRIT % 39.8   PLATELETS 10*3/mm3 417     Results from last 7 days   Lab Units 06/06/19  0742   SODIUM mmol/L 141   POTASSIUM mmol/L 3.9   CHLORIDE mmol/L 102   CO2 mmol/L 26.0   BUN mg/dL 17   CREATININE mg/dL 0.83   GLUCOSE mg/dL 115*   CALCIUM mg/dL 9.5   ALT (SGPT) U/L 19   AST (SGOT) U/L 21     Estimated Creatinine Clearance: 107 mL/min (by C-G formula based on SCr of 0.83 mg/dL).  Brief Urine Lab Results  (Last result in the past 365 days)      Color   Clarity   Blood   Leuk Est   Nitrite   Protein   CREAT   Urine HCG        06/06/19 0738 Yellow Hazy Negative Trace Negative Trace             No results found for: BNP  Imaging Results (last 24 hours)     Procedure Component Value Units Date/Time    MRI Brain With & Without Contrast [[id number]] Collected:  06/06/19 1226     Updated:  06/06/19 1238    Narrative:       EXAMINATION: MRI BRAIN W WO CONTRAST-06/06/2019:     INDICATION: VERTIGO. HA. Dizziness, vomiting, difficulty walking.     TECHNIQUE: Routine multiplanar imaging was obtained of the brain with  and without the administration of Gadolinium contrast.     COMPARISON: NONE.     FINDINGS: No evidence of restricted diffusion to  suggest evidence of an  acute ischemic insult. Flow voids are preserved in the major  intracranial vessels. No hemorrhage or hydrocephalus. No mass, mass  effect, or midline shift. No abnormal extra-axial fluid collection is  identified. No abnormal contrast enhancement is identified. The  visualized vascularity is unremarkable in appearance. Pituitary and  sella are unremarkable. Craniovertebral junction is preserved. Globes  and orbits are intact. The mastoid air cells are patent. No  cerebellopontine angle mass identified. The paranasal sinuses are clear.       Impression:       No acute intracranial abnormality identified.     D:  06/06/2019  E:  06/06/2019       CT Head Without Contrast [[id number]] Collected:  06/06/19 0826     Updated:  06/06/19 0844    Narrative:       EXAMINATION: CT HEAD WO CONTRAST- 06/06/2019      INDICATION: Vertigo, nausea, vomiting and abdominal pain     TECHNIQUE: 5 mm unenhanced images through the brain     The radiation dose reduction device was turned on for each scan per the  ALARA (As Low as Reasonably Achievable) protocol.     COMPARISON: NONE     FINDINGS: The calvarium appears intact. Included paranasal sinuses and  mastoids appear clear. Soft tissue window images show mild generalized  cerebral atrophy. The brain otherwise appears unremarkable, with no  evidence of hemorrhage, contusion, edema, no evidence of mass or mass  effect, infarct, hydrocephalus, or abnormal extra-axial collection.       Impression:       Brain appears within normal limits for age.     D:  06/06/2019  E:  06/06/2019          CT Abdomen Pelvis Without Contrast [[id number]] Collected:  06/06/19 0833     Updated:  06/06/19 0841    Narrative:       EXAMINATION: CT ABDOMEN AND PELVIS WO CONTRAST-06/06/2019:      INDICATION: Vertigo, NV, abdominal pain.      TECHNIQUE: Multiple axial CT imaging was obtained of the abdomen and  pelvis from the lung bases through the pubic symphysis without the  administration  of oral or intravenous contrast.     The radiation dose reduction device was turned on for each scan per the  ALARA (As Low as Reasonably Achievable) protocol.     COMPARISON: NONE.     FINDINGS:      ABDOMEN: The lung bases are grossly clear. The liver is homogeneous in  appearance. Surgical clips in the right upper quadrant from prior  cholecystectomy. The spleen is homogeneous in appearance. The pancreas  is homogeneous. Kidneys and adrenal glands are within normal limits. No  abdominal or retroperitoneal lymphadenopathy. No abnormal mass or fluid  collection is identified. Diverticulosis of colon without evidence of  diverticulitis. No free fluid or free air.     PELVIS: The pelvic organs are unremarkable. The pelvic portions of the  gastrointestinal tract are within normal limits. No abnormal mass or  fluid collection is identified. Degenerative changes seen within the  spine and pelvis. No pelvic adenopathy. No free fluid or free air.       Impression:       No acute intra-abdominal or pelvic abnormality.     D:  06/06/2019  E:  06/06/2019             XR Chest 1 View [[id number]] Collected:  06/06/19 0732     Updated:  06/06/19 0734    Narrative:       CR Chest 1 Vw    INDICATION:   Tachycardia with dizziness, nausea and vomiting beginning prior to arrival     COMPARISON:    None available.    FINDINGS:  Single portable AP view of the chest.  The heart and mediastinal contours are normal. The lungs are clear. No pneumothorax or pleural effusion.      Impression:       No acute cardiopulmonary findings.    Signer Name: Tawanda Caputo MD   Signed: 6/6/2019 7:32 AM   Workstation Name: [id number]                Assessment/Plan   Assessment / Plan     Active Hospital Problems    Diagnosis POA   • Disease of thyroid gland [E07.9] Yes   • Former tobacco use [Z87.891] Not Applicable   • Vertigo [R42] Yes   • Essential hypertension [I10] Yes         Vertigo, sudden onset  - differential includes vertebrobasilar CVA vs  BPPV  - CTA head and neck pending  - Neuro to see  - ECHO pending  - trial meclizine, if BPPV will benefit from outpt vestibular PT    Hypothyroid  - elevated TSH -->  Increased home Synthroid from 75 to 100mcg, PCP to recheck in 6 weeks    HTN  Chronic BLE edema  - takes prn Lasix at home, currently increased BLE edema -->  IV Lasix 40mg x1 then restart home Lasix PO in am (ECHO pending as part of CVA eval)  - restarted other home meds restarted as appropriate      DVT prophylaxis: Ambulate, mechanical.    CODE STATUS:    Code Status and Medical Interventions:   Ordered at: 06/06/19 1401     Code Status:    CPR     Medical Interventions (Level of Support Prior to Arrest):    Full       Admission Status:  I believe this patient meets OBSERVATION status, however if further evaluation or treatment plans warrant, status may change.  Based upon current information, I predict patient's care encounter to be less than or equal to 2 midnights.        Electronically signed by Dayanara Russo MD, 06/06/19, 2:02 PM.

## 2019-06-06 NOTE — CONSULTS
"Neurology    Referring provider:   No referring provider defined for this encounter.    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  She had protracted nausea and vomiting with retching and dry heaving.    Had a sensation of spinning which is aggravated by movement.    Got transiently better with the Phenergan and meclizine but does have a recurrence.    She has poorly controlled high blood pressure in spite of being on multiple medications.    She has significant peripheral edema.    She had a heart catheter in the remote past which was reportedly normal.        Review of Systems: She denies headache diplopia focal numbness or weakness in her upper or lower extremities.    She tells me that her blood pressure fluctuates wildly between the 130s in the 200s.    Says her hands are numb most of the time and that she drops things with some regularity.        All the systems are reviewed and are negative.        Home meds:     (Not in a hospital admission)    History  Past Medical History:   Diagnosis Date   • Anxiety    • Depression    • Disease of thyroid gland    • Hypertension    • Panic attacks    , Past Surgical History:   Procedure Laterality Date   • BACK SURGERY     • CHOLECYSTECTOMY     • NECK SURGERY     , Family History   Problem Relation Age of Onset   • Stroke Mother    • Heart attack Mother    • Heart failure Father    • Diabetes Father    • Heart failure Brother    , Social History     Tobacco Use   • Smoking status: Former Smoker   • Smokeless tobacco: Never Used   Substance Use Topics   • Alcohol use: No   • Drug use: No    and Allergies:  Patient has no known allergies.,    Vital Signs   Blood pressure 163/81, pulse 93, temperature 97.4 °F (36.3 °C), temperature source Oral, resp. rate 20, height 167.6 cm (66\"), weight 120 kg (265 lb), last menstrual " period 04/06/2019, SpO2 95 %.  Body mass index is 42.77 kg/m².    Physical Exam:   General: Obese white female in moderate distress              Head: No signs of trauma              Neck: No bruit              Resp: Normal breath sound              Cor: Regular rhythm              Extremties: 3+ pitting edema edema.  Positive Phalen's maneuver bilaterally.            Skin: Dry              Neuro: Mentally alert and oriented with normal memory attention and concentration.  Speech is articulate with no word finding problems.    Coordination is normal and finger-nose and fine finger movements.    Cranial nerves show normal optic fundi full eye movements.  Facial movement and sensation are normal.    Palate elevates normally tongue protrudes normally.    Reflexes are 1+ and equal bilaterally.    Motor testing shows symmetrical strength and tone in all muscle groups.    Sensory testing shows decreased sensation in the median nerve distribution bilaterally.        Results Review: MRI was personally reviewed and shows no evidence of stroke    Labs:  Lab Results (last 72 hours)     Procedure Component Value Units Date/Time    Urine Culture - Urine, Urine, Clean Catch [[id number]] Collected:  06/06/19 0738    Specimen:  Urine, Clean Catch Updated:  06/06/19 1332    TSH [[id number]]  (Abnormal) Collected:  06/06/19 0706    Specimen:  Blood Updated:  06/06/19 0949     TSH 6.280 mIU/mL     Narrative:       Due to abnormal TSH results, suggest ordering Free T4.    [place] Draw [[id number]] Collected:  06/06/19 0706    Specimen:  Blood Updated:  06/06/19 0901    Narrative:       The following orders were created for panel order [place] Draw.  Procedure                               Abnormality         Status                     ---------                               -----------         ------                     Light Blue Top[[id number]]                                   Final result               Green Top (Gel)[[id number]]                                   Final result               Lavender Top[[id number]]                                     Final result               Gold Top - SST[[id number]]                                   Final result               Green Top (No Gel)[[id number]]                                                            Please view results for these tests on the individual orders.    Light Blue Top [[id number]] Collected:  06/06/19 0706    Specimen:  Blood Updated:  06/06/19 0901     Extra Tube hold for add-on     Comment: Auto resulted       Green Top (Gel) [[id number]] Collected:  06/06/19 0742    Specimen:  Blood Updated:  06/06/19 0846     Extra Tube Hold for add-ons.     Comment: Auto resulted.       Comprehensive Metabolic Panel [[id number]]  (Abnormal) Collected:  06/06/19 0742    Specimen:  Blood Updated:  06/06/19 0831     Glucose 115 mg/dL      BUN 17 mg/dL      Creatinine 0.83 mg/dL      Sodium 141 mmol/L      Potassium 3.9 mmol/L      Chloride 102 mmol/L      CO2 26.0 mmol/L      Calcium 9.5 mg/dL      Total Protein 7.8 g/dL      Albumin 4.10 g/dL      ALT (SGPT) 19 U/L      AST (SGOT) 21 U/L      Alkaline Phosphatase 87 U/L      Total Bilirubin 0.2 mg/dL      eGFR Non African Amer 73 mL/min/1.73      Globulin 3.7 gm/dL      A/G Ratio 1.1 g/dL      BUN/Creatinine Ratio 20.5     Anion Gap 13.0 mmol/L     Narrative:       GFR Normal >60  Chronic Kidney Disease <60  Kidney Failure <15    Magnesium [[id number]]  (Normal) Collected:  06/06/19 0742    Specimen:  Blood Updated:  06/06/19 0831     Magnesium 2.0 mg/dL     Troponin [[id number]]  (Normal) Collected:  06/06/19 0742    Specimen:  Blood Updated:  06/06/19 0825     Troponin T <0.010 ng/mL     Narrative:       Troponin T Reference Range:  <= 0.03 ng/mL-   Negative for AMI  >0.03 ng/mL-     Abnormal for myocardial necrosis.  Clinicians would have to utilize clinical acumen, EKG, Troponin and serial changes to determine if it is an Acute Myocardial Infarction or  myocardial injury due to an underlying chronic condition.     BNP [[id number]]  (Normal) Collected:  06/06/19 0742    Specimen:  Blood Updated:  06/06/19 0822     proBNP 93.9 pg/mL     Narrative:       Among patients with dyspnea, NT-proBNP is highly sensitive for the detection of acute congestive heart failure. In addition NT-proBNP of <300 pg/ml effectively rules out acute congestive heart failure with 99% negative predictive value.    Lavender Top [[id number]] Collected:  06/06/19 0706    Specimen:  Blood Updated:  06/06/19 0816     Extra Tube hold for add-on     Comment: Auto resulted       Gold Top - SST [[id number]] Collected:  06/06/19 0706    Specimen:  Blood Updated:  06/06/19 0816     Extra Tube Hold for add-ons.     Comment: Auto resulted.       Urinalysis With Microscopic If Indicated (No Culture) - Urine, Clean Catch [[id number]]  (Abnormal) Collected:  06/06/19 0738    Specimen:  Urine, Clean Catch Updated:  06/06/19 0757     Color, UA Yellow     Appearance, UA Hazy     pH, UA 5.5     Specific Gravity, UA 1.025     Glucose, UA Negative     Ketones, UA Negative     Bilirubin, UA Negative     Blood, UA Negative     Protein, UA Trace     Leuk Esterase, UA Trace     Nitrite, UA Negative     Urobilinogen, UA 0.2 E.U./dL    Urinalysis, Microscopic Only - Urine, Clean Catch [[id number]]  (Abnormal) Collected:  06/06/19 0738    Specimen:  Urine, Clean Catch Updated:  06/06/19 0757     RBC, UA None Seen /HPF      WBC, UA 6-12 /HPF      Bacteria, UA 2+ /HPF      Squamous Epithelial Cells, UA 13-20 /HPF      Hyaline Casts, UA None Seen /LPF      Mucus, UA Small/1+ /HPF      Methodology Automated Microscopy    CBC & Differential [[id number]] Collected:  06/06/19 0706    Specimen:  Blood Updated:  06/06/19 0719    Narrative:       The following orders were created for panel order CBC & Differential.  Procedure                               Abnormality         Status                     ---------                                -----------         ------                     CBC Auto Differential[[id number]]        Abnormal            Final result                 Please view results for these tests on the individual orders.    CBC Auto Differential [[id number]]  (Abnormal) Collected:  06/06/19 0706    Specimen:  Blood Updated:  06/06/19 0719     WBC 13.54 10*3/mm3      RBC 4.54 10*6/mm3      Hemoglobin 13.0 g/dL      Hematocrit 39.8 %      MCV 87.7 fL      MCH 28.6 pg      MCHC 32.7 g/dL      RDW 14.7 %      RDW-SD 47.1 fl      MPV 9.5 fL      Platelets 417 10*3/mm3      Neutrophil % 75.0 %      Lymphocyte % 15.4 %      Monocyte % 7.4 %      Eosinophil % 1.8 %      Basophil % 0.4 %      Immature Grans % 1.2 %      Neutrophils, Absolute 10.16 10*3/mm3      Lymphocytes, Absolute 2.09 10*3/mm3      Monocytes, Absolute 1.00 10*3/mm3      Eosinophils, Absolute 0.24 10*3/mm3      Basophils, Absolute 0.05 10*3/mm3      Immature Grans, Absolute 0.16 10*3/mm3           Rads:  Imaging Results (last 72 hours)     Procedure Component Value Units Date/Time    MRI Brain With & Without Contrast [[id number]] Collected:  06/06/19 1226     Updated:  06/06/19 1238    Narrative:       EXAMINATION: MRI BRAIN W WO CONTRAST-06/06/2019:     INDICATION: VERTIGO. HA. Dizziness, vomiting, difficulty walking.     TECHNIQUE: Routine multiplanar imaging was obtained of the brain with  and without the administration of Gadolinium contrast.     COMPARISON: NONE.     FINDINGS: No evidence of restricted diffusion to suggest evidence of an  acute ischemic insult. Flow voids are preserved in the major  intracranial vessels. No hemorrhage or hydrocephalus. No mass, mass  effect, or midline shift. No abnormal extra-axial fluid collection is  identified. No abnormal contrast enhancement is identified. The  visualized vascularity is unremarkable in appearance. Pituitary and  sella are unremarkable. Craniovertebral junction is preserved. Globes  and orbits are intact. The mastoid  air cells are patent. No  cerebellopontine angle mass identified. The paranasal sinuses are clear.       Impression:       No acute intracranial abnormality identified.     D:  06/06/2019  E:  06/06/2019       CT Head Without Contrast [[id number]] Collected:  06/06/19 0826     Updated:  06/06/19 0844    Narrative:       EXAMINATION: CT HEAD WO CONTRAST- 06/06/2019      INDICATION: Vertigo, nausea, vomiting and abdominal pain     TECHNIQUE: 5 mm unenhanced images through the brain     The radiation dose reduction device was turned on for each scan per the  ALARA (As Low as Reasonably Achievable) protocol.     COMPARISON: NONE     FINDINGS: The calvarium appears intact. Included paranasal sinuses and  mastoids appear clear. Soft tissue window images show mild generalized  cerebral atrophy. The brain otherwise appears unremarkable, with no  evidence of hemorrhage, contusion, edema, no evidence of mass or mass  effect, infarct, hydrocephalus, or abnormal extra-axial collection.       Impression:       Brain appears within normal limits for age.     D:  06/06/2019  E:  06/06/2019          CT Abdomen Pelvis Without Contrast [[id number]] Collected:  06/06/19 0833     Updated:  06/06/19 0841    Narrative:       EXAMINATION: CT ABDOMEN AND PELVIS WO CONTRAST-06/06/2019:      INDICATION: Vertigo, NV, abdominal pain.      TECHNIQUE: Multiple axial CT imaging was obtained of the abdomen and  pelvis from the lung bases through the pubic symphysis without the  administration of oral or intravenous contrast.     The radiation dose reduction device was turned on for each scan per the  ALARA (As Low as Reasonably Achievable) protocol.     COMPARISON: NONE.     FINDINGS:      ABDOMEN: The lung bases are grossly clear. The liver is homogeneous in  appearance. Surgical clips in the right upper quadrant from prior  cholecystectomy. The spleen is homogeneous in appearance. The pancreas  is homogeneous. Kidneys and adrenal glands are within  normal limits. No  abdominal or retroperitoneal lymphadenopathy. No abnormal mass or fluid  collection is identified. Diverticulosis of colon without evidence of  diverticulitis. No free fluid or free air.     PELVIS: The pelvic organs are unremarkable. The pelvic portions of the  gastrointestinal tract are within normal limits. No abnormal mass or  fluid collection is identified. Degenerative changes seen within the  spine and pelvis. No pelvic adenopathy. No free fluid or free air.       Impression:       No acute intra-abdominal or pelvic abnormality.     D:  06/06/2019  E:  06/06/2019             XR Chest 1 View [[id number]] Collected:  06/06/19 0732     Updated:  06/06/19 0734    Narrative:       CR Chest 1 Vw    INDICATION:   Tachycardia with dizziness, nausea and vomiting beginning prior to arrival     COMPARISON:    None available.    FINDINGS:  Single portable AP view of the chest.  The heart and mediastinal contours are normal. The lungs are clear. No pneumothorax or pleural effusion.      Impression:       No acute cardiopulmonary findings.    Signer Name: Tawanda Caputo MD   Signed: 6/6/2019 7:32 AM   Workstation Name: [email or web address]               Assessment: Vertigo, likely acute labyrinthitis    Poorly controlled hypertension    Bilateral carpal tunnel syndrome     Plan:    View of the patient's accelerated hypertension I think that a CT angiogram of the head neck is appropriate.    Meclizine and Zofran Phenergan and benzos are appropriate for symptomatic relief.    EMG nerve conduction study of both upper extremities.    Comment:   Full evaluation    I discussed the patients findings and my recommendations with patient, family and primary care team      Dmitry Hollingsworth MD  06/06/19  4:18 PM

## 2019-06-07 ENCOUNTER — APPOINTMENT (OUTPATIENT)
Dept: NEUROLOGY | Facility: HOSPITAL | Age: 49
End: 2019-06-07

## 2019-06-07 ENCOUNTER — APPOINTMENT (OUTPATIENT)
Dept: CARDIOLOGY | Facility: HOSPITAL | Age: 49
End: 2019-06-07

## 2019-06-07 VITALS
SYSTOLIC BLOOD PRESSURE: 148 MMHG | RESPIRATION RATE: 14 BRPM | HEIGHT: 66 IN | OXYGEN SATURATION: 96 % | WEIGHT: 256.8 LBS | DIASTOLIC BLOOD PRESSURE: 93 MMHG | BODY MASS INDEX: 41.27 KG/M2 | TEMPERATURE: 98 F | HEART RATE: 74 BPM

## 2019-06-07 LAB
BACTERIA SPEC AEROBE CULT: NORMAL
BH CV ECHO MEAS - AO ROOT AREA (BSA CORRECTED): 1.2
BH CV ECHO MEAS - AO ROOT AREA: 5.9 CM^2
BH CV ECHO MEAS - AO ROOT DIAM: 2.7 CM
BH CV ECHO MEAS - BSA(HAYCOCK): 2.4 M^2
BH CV ECHO MEAS - BSA: 2.2 M^2
BH CV ECHO MEAS - BZI_BMI: 41.3 KILOGRAMS/M^2
BH CV ECHO MEAS - BZI_METRIC_HEIGHT: 167.6 CM
BH CV ECHO MEAS - BZI_METRIC_WEIGHT: 116.1 KG
BH CV ECHO MEAS - EDV(CUBED): 82.3 ML
BH CV ECHO MEAS - EDV(TEICH): 85.3 ML
BH CV ECHO MEAS - EF(CUBED): 82.8 %
BH CV ECHO MEAS - EF(TEICH): 75.9 %
BH CV ECHO MEAS - ESV(CUBED): 14.2 ML
BH CV ECHO MEAS - ESV(TEICH): 20.6 ML
BH CV ECHO MEAS - FS: 44.4 %
BH CV ECHO MEAS - IVS/LVPW: 1
BH CV ECHO MEAS - IVSD: 0.97 CM
BH CV ECHO MEAS - LA DIMENSION: 3.7 CM
BH CV ECHO MEAS - LA/AO: 1.3
BH CV ECHO MEAS - LAD MAJOR: 4.6 CM
BH CV ECHO MEAS - LAT PEAK E' VEL: 15.5 CM/SEC
BH CV ECHO MEAS - LATERAL E/E' RATIO: 6
BH CV ECHO MEAS - LV MASS(C)D: 139.1 GRAMS
BH CV ECHO MEAS - LV MASS(C)DI: 62.6 GRAMS/M^2
BH CV ECHO MEAS - LVIDD: 4.3 CM
BH CV ECHO MEAS - LVIDS: 2.4 CM
BH CV ECHO MEAS - LVPWD: 0.97 CM
BH CV ECHO MEAS - MED PEAK E' VEL: 7.2 CM/SEC
BH CV ECHO MEAS - MEDIAL E/E' RATIO: 12.8
BH CV ECHO MEAS - MV A MAX VEL: 68.6 CM/SEC
BH CV ECHO MEAS - MV DEC SLOPE: 387.2 CM/SEC^2
BH CV ECHO MEAS - MV DEC TIME: 0.18 SEC
BH CV ECHO MEAS - MV E MAX VEL: 94.8 CM/SEC
BH CV ECHO MEAS - MV E/A: 1.4
BH CV ECHO MEAS - MV P1/2T MAX VEL: 104.5 CM/SEC
BH CV ECHO MEAS - MV P1/2T: 79.1 MSEC
BH CV ECHO MEAS - MVA P1/2T LCG: 2.1 CM^2
BH CV ECHO MEAS - MVA(P1/2T): 2.8 CM^2
BH CV ECHO MEAS - PA ACC SLOPE: 383.8 CM/SEC^2
BH CV ECHO MEAS - PA ACC TIME: 0.15 SEC
BH CV ECHO MEAS - PA PR(ACCEL): 12.5 MMHG
BH CV ECHO MEAS - RAP SYSTOLE: 7 MMHG
BH CV ECHO MEAS - RV MAX PG: 0.64 MMHG
BH CV ECHO MEAS - RV V1 MAX: 40 CM/SEC
BH CV ECHO MEAS - RVSP: 33 MMHG
BH CV ECHO MEAS - SI(CUBED): 30.7 ML/M^2
BH CV ECHO MEAS - SI(TEICH): 29.2 ML/M^2
BH CV ECHO MEAS - SV(CUBED): 68.1 ML
BH CV ECHO MEAS - SV(TEICH): 64.8 ML
BH CV ECHO MEAS - TAPSE (>1.6): 1.9 CM2
BH CV ECHO MEAS - TR MAX PG: 26 MMHG
BH CV ECHO MEAS - TR MAX VEL: 255.2 CM/SEC
BH CV ECHO MEASUREMENTS AVERAGE E/E' RATIO: 8.35
BH CV XLRA - RV BASE: 3.8 CM
BH CV XLRA - RV LENGTH: 6.1 CM
BH CV XLRA - RV MID: 3.5 CM
BH CV XLRA - TDI S': 10.9 CM/SEC
CHOLEST SERPL-MCNC: 124 MG/DL (ref 0–200)
GLUCOSE BLDC GLUCOMTR-MCNC: 90 MG/DL (ref 70–130)
GLUCOSE BLDC GLUCOMTR-MCNC: 92 MG/DL (ref 70–130)
HBA1C MFR BLD: 5.1 % (ref 4.8–5.6)
HDLC SERPL-MCNC: 31 MG/DL (ref 40–60)
LDLC SERPL CALC-MCNC: 71 MG/DL (ref 0–100)
LDLC/HDLC SERPL: 2.28 {RATIO}
LEFT ATRIUM VOLUME INDEX: 19.8 ML/M^2
LEFT ATRIUM VOLUME: 44 ML
MAXIMAL PREDICTED HEART RATE: 170 BPM
STRESS TARGET HR: 145 BPM
TRIGL SERPL-MCNC: 112 MG/DL (ref 0–150)
VLDLC SERPL-MCNC: 22.4 MG/DL

## 2019-06-07 PROCEDURE — 99214 OFFICE O/P EST MOD 30 MIN: CPT | Performed by: INTERNAL MEDICINE

## 2019-06-07 PROCEDURE — 93306 TTE W/DOPPLER COMPLETE: CPT | Performed by: INTERNAL MEDICINE

## 2019-06-07 PROCEDURE — 25010000002 ONDANSETRON PER 1 MG: Performed by: FAMILY MEDICINE

## 2019-06-07 PROCEDURE — 95886 MUSC TEST DONE W/N TEST COMP: CPT

## 2019-06-07 PROCEDURE — 97165 OT EVAL LOW COMPLEX 30 MIN: CPT

## 2019-06-07 PROCEDURE — 82962 GLUCOSE BLOOD TEST: CPT

## 2019-06-07 PROCEDURE — 83036 HEMOGLOBIN GLYCOSYLATED A1C: CPT | Performed by: FAMILY MEDICINE

## 2019-06-07 PROCEDURE — 80061 LIPID PANEL: CPT | Performed by: FAMILY MEDICINE

## 2019-06-07 PROCEDURE — 95910 NRV CNDJ TEST 7-8 STUDIES: CPT

## 2019-06-07 PROCEDURE — 96376 TX/PRO/DX INJ SAME DRUG ADON: CPT

## 2019-06-07 PROCEDURE — G0378 HOSPITAL OBSERVATION PER HR: HCPCS

## 2019-06-07 PROCEDURE — 93306 TTE W/DOPPLER COMPLETE: CPT

## 2019-06-07 RX ORDER — ONDANSETRON 4 MG/1
4 TABLET, FILM COATED ORAL EVERY 8 HOURS PRN
Qty: 30 TABLET | Refills: 0 | Status: SHIPPED | OUTPATIENT
Start: 2019-06-07 | End: 2021-10-22

## 2019-06-07 RX ORDER — MECLIZINE HYDROCHLORIDE 25 MG/1
25 TABLET ORAL 3 TIMES DAILY PRN
Qty: 30 TABLET | Refills: 0 | Status: SHIPPED | OUTPATIENT
Start: 2019-06-07

## 2019-06-07 RX ADMIN — METOPROLOL SUCCINATE 50 MG: 50 TABLET, EXTENDED RELEASE ORAL at 09:02

## 2019-06-07 RX ADMIN — LEVOTHYROXINE SODIUM 100 MCG: 100 TABLET ORAL at 05:50

## 2019-06-07 RX ADMIN — ONDANSETRON 4 MG: 2 INJECTION INTRAMUSCULAR; INTRAVENOUS at 15:51

## 2019-06-07 RX ADMIN — MECLIZINE HYDROCHLORIDE 25 MG: 25 TABLET ORAL at 09:04

## 2019-06-07 RX ADMIN — ONDANSETRON 4 MG: 2 INJECTION INTRAMUSCULAR; INTRAVENOUS at 09:04

## 2019-06-07 RX ADMIN — HYDROCHLOROTHIAZIDE: 25 TABLET ORAL at 09:04

## 2019-06-07 RX ADMIN — BUPROPION HYDROCHLORIDE 150 MG: 150 TABLET, FILM COATED, EXTENDED RELEASE ORAL at 09:02

## 2019-06-07 RX ADMIN — FUROSEMIDE 20 MG: 20 TABLET ORAL at 09:05

## 2019-06-07 RX ADMIN — BUSPIRONE HYDROCHLORIDE 15 MG: 10 TABLET ORAL at 09:03

## 2019-06-07 RX ADMIN — ACETAMINOPHEN 650 MG: 325 TABLET ORAL at 12:20

## 2019-06-07 RX ADMIN — ACETAMINOPHEN 650 MG: 325 TABLET ORAL at 02:25

## 2019-06-07 RX ADMIN — SERTRALINE HYDROCHLORIDE 50 MG: 50 TABLET ORAL at 09:02

## 2019-06-07 RX ADMIN — ONDANSETRON 4 MG: 2 INJECTION INTRAMUSCULAR; INTRAVENOUS at 02:23

## 2019-06-07 RX ADMIN — ASPIRIN 81 MG 81 MG: 81 TABLET ORAL at 09:02

## 2019-06-07 NOTE — CONSULTS
Patient does not meet diabetes education order criteria of educate if >7.5% and pt was 5.1%, therefore patient was not seen for diabetes education at this time.  Please re consult as needed.

## 2019-06-07 NOTE — PLAN OF CARE
Problem: Patient Care Overview  Goal: Plan of Care Review  Outcome: Ongoing (interventions implemented as appropriate)   06/07/19 0805   Coping/Psychosocial   Plan of Care Reviewed With patient   Plan of Care Review   Progress improving   OTHER   Outcome Summary OT evaluation completed. Pt. with headache on arrival, but developed increased nausea and dizziness on standing and performance of task. Pt. wanting to hold to wall or IV pole with mobility. Pt. will likely need AD for mobility safety. OT made some home safety recommendations, such as grab bars and shower chair. Pt. will benefit from continue skilled OT services for further education on safety. Pt. would benefit from 1-2 visits HH OT to work on home safety techniques and further recommendations.

## 2019-06-07 NOTE — DISCHARGE SUMMARY
[facility] Medicine Services  DISCHARGE SUMMARY    Patient Name: Andres Cortez  : 1970  MRN: [id number]    Date of Admission: 2019  Date of Discharge: 2019  Primary Care Physician: Carlton Myers APRN    Consults     Date and Time Order Name Status Description    2019 1639 Inpatient Neurology Consult Stroke            Hospital Course     Presenting Problem:   Vertigo [R42]    Active Hospital Problems    Diagnosis  POA   • Disease of thyroid gland [E07.9]  Yes   • Former tobacco use [Z87.891]  Not Applicable   • Vertigo [R42]  Yes   • Essential hypertension [I10]  Yes      Resolved Hospital Problems   No resolved problems to display.          Hospital Course:  Andres Cortez is a 49 y.o. female admitted with vertigo.    Vertigo due to acute labyrinthitis: 50 y/o admitted with vertigo. Upon arrival she underwent CT head, CTA head and neck, MRI brain, echocardiogram all of which were unremarkable. She was seen by neurology who felt this was likely acute labyrinthitis. She will be managed supportively with PO meclizine as she had improved at d/c.    **She was incidentally found to have moderate bilateral carpal tunnel. She will be discharged with bilateral wrist splints and will see hand surgery as outpatient in 2 weeks.    Discharge Follow Up Recommendations for labs/diagnostics:   PCP in 1-2 weeks   Hand surgery in 2 weeks.    Day of Discharge     HPI: Up in bed. Overall feeling much better but still with minimal dizziness. No new concerns.    Review of Systems  Gen- No fevers, chills  CV- No chest pain, palpitations  Resp- No cough, dyspnea  GI- No N/V/D, abd pain    Otherwise ROS is negative except as mentioned in the HPI.    Vital Signs:   Temp:  [98 °F (36.7 °C)-98.5 °F (36.9 °C)] 98 °F (36.7 °C)  Heart Rate:  [74-94] 74  Resp:  [14-18] 14  BP: (140-185)/() 148/93     Physical Exam:  Constitutional: No acute distress, awake, alert  HENT: NCAT, mucous  membranes moist  Respiratory: Clear to auscultation bilaterally, respiratory effort normal   Cardiovascular: RRR, no murmurs, rubs, or gallops, palpable pedal pulses bilaterally  Gastrointestinal: Positive bowel sounds, soft, nontender, nondistended  Musculoskeletal: No bilateral ankle edema  Psychiatric: Appropriate affect, cooperative  Neurologic: Oriented x 3, strength symmetric in all extremities, Cranial Nerves grossly intact to confrontation, speech clear  Skin: No rashes    Pertinent  and/or Most Recent Results     Results from last 7 days   Lab Units 06/06/19  0742 06/06/19  0706   WBC 10*3/mm3  --  13.54*   HEMOGLOBIN g/dL  --  13.0   HEMATOCRIT %  --  39.8   PLATELETS 10*3/mm3  --  417   SODIUM mmol/L 141  --    POTASSIUM mmol/L 3.9  --    CHLORIDE mmol/L 102  --    CO2 mmol/L 26.0  --    BUN mg/dL 17  --    CREATININE mg/dL 0.83  --    GLUCOSE mg/dL 115*  --    CALCIUM mg/dL 9.5  --      Results from last 7 days   Lab Units 06/06/19  0742   BILIRUBIN mg/dL 0.2   ALK PHOS U/L 87   ALT (SGPT) U/L 19   AST (SGOT) U/L 21     Results from last 7 days   Lab Units 06/07/19  0600   CHOLESTEROL mg/dL 124   TRIGLYCERIDES mg/dL 112   HDL CHOL mg/dL 31*     Results from last 7 days   Lab Units 06/07/19  0600 06/06/19  0742 06/06/19  0706   TSH mIU/mL  --   --  6.280*   HEMOGLOBIN A1C % 5.10  --   --    PROBNP pg/mL  --  93.9  --    TROPONIN T ng/mL  --  <0.010  --        Brief Urine Lab Results  (Last result in the past 365 days)      Color   Clarity   Blood   Leuk Est   Nitrite   Protein   CREAT   Urine HCG        06/06/19 0738 Yellow Hazy Negative Trace Negative Trace               Microbiology Results Abnormal     Procedure Component Value - Date/Time    Urine Culture - Urine, Urine, Clean Catch [[id number]] Collected:  06/06/19 0738    Lab Status:  Final result Specimen:  Urine, Clean Catch Updated:  06/07/19 0923     Urine Culture 50,000 CFU/mL Mixed Abena Isolated    Narrative:       Specimen contains mixed  organisms of questionable pathogenicity which indicates contamination with commensal ellis.  Further identification is unlikely to provide clinically useful information.  Suggest recollection.          Imaging Results (all)     Procedure Component Value Units Date/Time    CT Head Without Contrast [[id number]] Collected:  06/06/19 0826     Updated:  06/06/19 2135    Narrative:       EXAMINATION: CT HEAD WO CONTRAST- 06/06/2019      INDICATION: Vertigo, nausea, vomiting and abdominal pain     TECHNIQUE: 5 mm unenhanced images through the brain     The radiation dose reduction device was turned on for each scan per the  ALARA (As Low as Reasonably Achievable) protocol.     COMPARISON: NONE     FINDINGS: The calvarium appears intact. Included paranasal sinuses and  mastoids appear clear. Soft tissue window images show mild generalized  cerebral atrophy. The brain otherwise appears unremarkable, with no  evidence of hemorrhage, contusion, edema, no evidence of mass or mass  effect, infarct, hydrocephalus, or abnormal extra-axial collection.       Impression:       Brain appears within normal limits for age.     D:  06/06/2019  E:  06/06/2019        This report was finalized on 6/6/2019 9:32 PM by DR. Carroll Trimble MD.       CT Angiogram Neck With & Without Contrast [[id number]] Collected:  06/06/19 1834     Updated:  06/06/19 1846    Narrative:       EXAMINATION: CT ANGIOGRAM NECK WWO CONTRAST - 06/06/2019      INDICATION: Evaluate for stroke.     TECHNIQUE: CT scan of the neck was performed prior to and following  intravenous contrast. Images are displayed in axial, coronal and  sagittal sections (3D).     The radiation dose reduction device was turned on for each scan per the  ALARA (As Low as Reasonably Achievable) protocol.     COMPARISON: NONE     FINDINGS: Both common carotid arteries are normal. The carotid  bifurcations are normal. Both vertebral arteries are patent.       Impression:       Normal MRA of the neck. The  carotid bifurcations are normal  and both vertebral arteries are patent.     DICTATED:   06/06/2019  EDITED/ls :   06/06/2019         This report was finalized on 6/6/2019 6:42 PM by Dr. Adan Golden MD.       CT Angiogram Head With & Without Contrast [[id number]] Collected:  06/06/19 1835     Updated:  06/06/19 1845    Narrative:       EXAMINATION: CT ANGIOGRAM HEAD WWO CONTRAST - 06/06/2019      INDICATION: Evaluate for stroke.     TECHNIQUE: Intracranial CTA images were obtained prior to and following  intravenous contrast. Images were labeled in the axial, coronal and  sagittal projections (3D).     The radiation dose reduction device was turned on for each scan per the  ALARA (As Low as Reasonably Achievable) protocol.     COMPARISON: NONE     FINDINGS: The anterior cerebral arteries are normal as are the primary  and secondary branches with no anterior indicating artery aneurysm.  Similarly, both middle cerebral arteries are also patent as are their  branches with no evidence of aneurysm. There is no posterior  communicating artery aneurysm. The vertebrobasilar complex is normal.  The superior cerebellar and posterior cerebral arteries are normal.       Impression:       Normal intracranial CTA.     DICTATED:   06/06/2019  EDITED/ls :   06/06/2019      This report was finalized on 6/6/2019 6:42 PM by Dr. Adan Golden MD.       MRI Brain With & Without Contrast [[id number]] Collected:  06/06/19 1226     Updated:  06/06/19 1712    Narrative:       EXAMINATION: MRI BRAIN W WO CONTRAST-06/06/2019:     INDICATION: VERTIGO. HA. Dizziness, vomiting, difficulty walking.     TECHNIQUE: Routine multiplanar imaging was obtained of the brain with  and without the administration of Gadolinium contrast.     COMPARISON: NONE.     FINDINGS: No evidence of restricted diffusion to suggest evidence of an  acute ischemic insult. Flow voids are preserved in the major  intracranial vessels. No hemorrhage or hydrocephalus. No mass,  mass  effect, or midline shift. No abnormal extra-axial fluid collection is  identified. No abnormal contrast enhancement is identified. The  visualized vascularity is unremarkable in appearance. Pituitary and  sella are unremarkable. Craniovertebral junction is preserved. Globes  and orbits are intact. The mastoid air cells are patent. No  cerebellopontine angle mass identified. The paranasal sinuses are clear.       Impression:       No acute intracranial abnormality identified.     D:  06/06/2019  E:  06/06/2019     This report was finalized on 6/6/2019 5:09 PM by Dr. Delia Pierce MD.       CT Abdomen Pelvis Without Contrast [[id number]] Collected:  06/06/19 0833     Updated:  06/06/19 1710    Narrative:       EXAMINATION: CT ABDOMEN AND PELVIS WO CONTRAST-06/06/2019:      INDICATION: Vertigo, NV, abdominal pain.      TECHNIQUE: Multiple axial CT imaging was obtained of the abdomen and  pelvis from the lung bases through the pubic symphysis without the  administration of oral or intravenous contrast.     The radiation dose reduction device was turned on for each scan per the  ALARA (As Low as Reasonably Achievable) protocol.     COMPARISON: NONE.     FINDINGS:      ABDOMEN: The lung bases are grossly clear. The liver is homogeneous in  appearance. Surgical clips in the right upper quadrant from prior  cholecystectomy. The spleen is homogeneous in appearance. The pancreas  is homogeneous. Kidneys and adrenal glands are within normal limits. No  abdominal or retroperitoneal lymphadenopathy. No abnormal mass or fluid  collection is identified. Diverticulosis of colon without evidence of  diverticulitis. No free fluid or free air.     PELVIS: The pelvic organs are unremarkable. The pelvic portions of the  gastrointestinal tract are within normal limits. No abnormal mass or  fluid collection is identified. Degenerative changes seen within the  spine and pelvis. No pelvic adenopathy. No free fluid or free air.        Impression:       No acute intra-abdominal or pelvic abnormality.     D:  06/06/2019  E:  06/06/2019           This report was finalized on 6/6/2019 5:07 PM by Dr. Delia Pierce MD.       XR Chest 1 View [[id number]] Collected:  06/06/19 0732     Updated:  06/06/19 0734    Narrative:       CR Chest 1 Vw    INDICATION:   Tachycardia with dizziness, nausea and vomiting beginning prior to arrival     COMPARISON:    None available.    FINDINGS:  Single portable AP view of the chest.  The heart and mediastinal contours are normal. The lungs are clear. No pneumothorax or pleural effusion.      Impression:       No acute cardiopulmonary findings.    Signer Name: Tawanda Caputo MD   Signed: 6/6/2019 7:32 AM   Workstation Name: [employer]                       Results for orders placed during the hospital encounter of 06/06/19   Adult Transthoracic Echo Complete W/ Cont if Necessary Per Protocol (With Agitated Saline)    Narrative · Left ventricular systolic function is normal.  · Saline test results are negative for right to left atrial level shunt.  · Estimated EF appears to be in the range of 56 - 60%.            Discharge Details        Discharge Medications      New Medications      Instructions Start Date   meclizine 25 MG tablet  Commonly known as:  ANTIVERT   25 mg, Oral, 3 Times Daily PRN      ondansetron 4 MG tablet  Commonly known as:  ZOFRAN   4 mg, Oral, Every 8 Hours PRN         Continue These Medications      Instructions Start Date   aspirin 81 MG tablet   81 mg, Oral, Daily      buPROPion  MG 24 hr tablet  Commonly known as:  WELLBUTRIN XL   150 mg, Oral, Every Other Day      busPIRone 15 MG tablet  Commonly known as:  BUSPAR   15 mg, Oral, Daily      CloNIDine 0.1 MG tablet  Commonly known as:  CATAPRES   0.05-0.1 mg, Oral, 2 Times Daily      furosemide 20 MG tablet  Commonly known as:  LASIX   20 mg, Oral, As Needed      levothyroxine 75 MCG tablet  Commonly known as:  SYNTHROID, LEVOTHROID    75 mcg, Oral, Daily      metoprolol succinate XL 50 MG 24 hr tablet  Commonly known as:  TOPROL-XL   50 mg, Oral, Daily      NITROSTAT 0.4 MG SL tablet  Generic drug:  nitroglycerin   5 tablets, Sublingual, Every 5 Minutes PRN, Place 1 tablet under the tongue every 5 minutes for up to 3 doses as needed for chest pain. If pain persists, call 911      olmesartan-hydrochlorothiazide 40-25 MG per tablet  Commonly known as:  BENICAR HCT   1 tablet, Oral, Daily      potassium chloride 10 MEQ CR capsule  Commonly known as:  MICRO-K   10 mEq, As Needed      ZOLOFT 50 MG tablet  Generic drug:  sertraline   50 mg, Oral, Nightly             No Known Allergies      Discharge Disposition:      Discharge Diet:  Diet Order   Procedures   • Diet Regular         Discharge Activity: As tolerated        CODE STATUS:    Code Status and Medical Interventions:   Ordered at: 06/06/19 1401     Code Status:    CPR     Medical Interventions (Level of Support Prior to Arrest):    Full         No future appointments.    Additional Instructions for the Follow-ups that You Need to Schedule     Discharge Follow-up with PCP   As directed       Currently Documented PCP:    Carlton Myers APRN    PCP Phone Number:    [phone]     Follow Up Details:  1 week hospital follow up         Discharge Follow-up with Specialty: Orthopedic surgery - New patient visit for carpal tunnel; 2 Weeks   As directed      Specialty:  Orthopedic surgery - New patient visit for carpal tunnel    Follow Up:  2 Weeks               Time Spent on Discharge:  34 minutes    Electronically signed by Simran Joshi II, DO, 06/07/19, 1:00 PM.

## 2019-06-07 NOTE — THERAPY EVALUATION
Acute Care - Occupational Therapy Initial Evaluation  [facility]     Patient Name: Andres Cortez  : 1970  MRN: [id number]  Today's Date: 2019  Onset of Illness/Injury or Date of Surgery: 19  Date of Referral to OT: 19  Referring Physician: MD Karan    Admit Date: 2019       ICD-10-CM ICD-9-CM   1. Intractable vomiting with nausea, unspecified vomiting type R11.2 536.2   2. Vertigo R42 780.4   3. Nonintractable headache, unspecified chronicity pattern, unspecified headache type R51 784.0   4. Impaired mobility and ADLs Z74.09 799.89     Patient Active Problem List   Diagnosis   • Chest pain   • Shortness of breath   • Essential hypertension   • Abnormal stress test   • Edema of lower extremity   • Disease of thyroid gland   • Former tobacco use   • Vertigo     Past Medical History:   Diagnosis Date   • Anxiety    • Depression    • Disease of thyroid gland    • Hypertension    • Panic attacks      Past Surgical History:   Procedure Laterality Date   • BACK SURGERY     • CHOLECYSTECTOMY     • NECK SURGERY            OT ASSESSMENT FLOWSHEET (last 12 hours)      Occupational Therapy Evaluation     Row Name 19 0805                   OT Evaluation Time/Intention    Subjective Information  complains of;nausea/vomiting;pain;dizziness  -GÓMEZ        Document Type  evaluation  -GÓMEZ        Mode of Treatment  individual therapy;occupational therapy  -GÓMEZ        Patient Effort  good  -GÓMEZ        Symptoms Noted During/After Treatment  significant change in vital signs;dizziness  -GÓMEZ        Comment  BP elevated, nausea and dizziness occured.  -GÓMEZ           General Information    Patient Profile Reviewed?  yes  -GÓMEZ        Onset of Illness/Injury or Date of Surgery  19  -GÓMEZ        Referring Physician  MD Karan  -GÓMEZ        Patient Observations  alert;cooperative;agree to therapy  -GÓMEZ        Patient/Family Observations  No caregiver present until last couple minutes of evaluation.  -GÓMEZ        General  Observations of Patient  Pt. supine in bed with [place] cath and on tele, compression LE's.  -GÓMEZ        Prior Level of Function  independent:;all household mobility;community mobility;ADL's;home management;driving;shopping;work Pt nurse at [employer]  -GÓMEZ        Equipment Currently Used at Home  none caregiver stated had a shower chair she could use  -GÓMEZ        Pertinent History of Current Functional Problem  Pt. at work and developed sudden onset of vertigo and near syncope.  Pt. with intractable HA and N/V and mild abdominal pain.  Symptoms worsening with head mvmt or standing activity.  CT and MRI negative.  Pt diagnosed with near syncope and possible labryrinthitis.  -GÓMEZ        Existing Precautions/Restrictions  fall pt. reports recent fall out of shower and two down steps  -GÓMEZ        Limitations/Impairments  safety/cognitive  -GÓMEZ        Risks Reviewed  patient:;LOB;nausea/vomiting;dizziness;change in vital signs;lines disloged  -GÓMEZ        Benefits Reviewed  patient:;improve function;increase independence;increase knowledge  -GÓMEZ        Barriers to Rehab  none identified  -GÓMEZ           Relationship/Environment    Primary Source of Support/Comfort  child(xiomara)  -GÓMEZ        Lives With  child(xiomara), adult son lives with pt., but works a lot and rarely there per pt  -GÓMEZ           Resource/Environmental Concerns    Current Living Arrangements  home/apartment/condo  -GÓMEZ           Home Main Entrance    Number of Stairs, Main Entrance  four  -GÓMEZ        Stair Railings, Main Entrance  railings on both sides of stairs  -GÓMEZ           Cognitive Assessment/Interventions    Additional Documentation  Cognitive Assessment/Intervention (Group)  -GÓMEZ           Cognitive Assessment/Intervention- PT/OT    Affect/Mental Status (Cognitive)  WFL  -GÓMEZ        Orientation Status (Cognition)  oriented x 4  -GÓMEZ        Follows Commands (Cognition)  WFL  -GÓMEZ        Safety Deficit (Cognitive)  safety precautions awareness  -GÓMEZ         Personal Safety Interventions  fall prevention program maintained;gait belt;nonskid shoes/slippers when out of bed  -GÓMEZ           Safety Issues, Functional Mobility    Safety Issues Affecting Function (Mobility)  safety precaution awareness;safety precautions follow-through/compliance  -GÓMEZ        Impairments Affecting Function (Mobility)  balance dizziness, pt. report room spinning  -GÓMEZ           Bed Mobility Assessment/Treatment    Bed Mobility Assessment/Treatment  supine-sit;scooting/bridging  -GÓEMZ        Scooting/Bridging [place] (Bed Mobility)  supervision  -GÓMEZ        Supine-Sit [place] (Bed Mobility)  supervision  -GÓMEZ        Assistive Device (Bed Mobility)  bed rails  -GÓMEZ        Comment (Bed Mobility)  pt. had to move slowly due to dizziness  -GÓMEZ           Functional Mobility    Functional Mobility- Ind. Level  minimum assist (75% patient effort)  -GÓMEZ        Functional Mobility- Device  other (see comments) gait belt, holding to IV pole  -        Functional Mobility-Distance (Feet)  20  -GÓMEZ        Functional Mobility- Safety Issues  step length decreased;balance decreased during turns  -GÓMEZ        Functional Mobility- Comment  pt. dizzy needing to hold to IV pole and at times using walls.  Pt. will likely need walker use.  -GÓMEZ           Transfer Assessment/Treatment    Transfer Assessment/Treatment  sit-stand transfer;stand-sit transfer  -GÓMEZ           Sit-Stand Transfer    Sit-Stand [place] (Transfers)  contact guard  -GÓMEZ        Assistive Device (Sit-Stand Transfers)  other (see comments) gait belt  -GÓMEZ           Stand-Sit Transfer    Stand-Sit [place] (Transfers)  contact guard  -GÓMEZ        Assistive Device (Stand-Sit Transfers)  other (see comments) gait belt  -GÓMEZ           ADL Assessment/Intervention    BADL Assessment/Intervention  toileting;lower body dressing;grooming  -           Lower Body Dressing Assessment/Training    Lower Body Dressing [place] Level   doff;don;socks;independent  -GÓMEZ        Lower Body Dressing Position  supported sitting  -GÓMEZ        Comment (Lower Body Dressing)  educated on being in a supported position with attempt  -GÓMEZ           Grooming Assessment/Training    [place] Level (Grooming)  hair care, combing/brushing;wash face, hands  -GÓMEZ        Grooming Position  supported standing;supported sitting  -GÓMEZ        Comment (Grooming)  pt. able to wash hands leaning against sink, but got increased dizziness and nausea so had to comb hair and wash face in recliner.  -GÓMEZ           Toileting Assessment/Training    [place] Level (Toileting)  adjust/manage clothing;perform perineal hygiene;minimum assist (75% patient effort)  -GÓMEZ        Assistive Devices (Toileting)  -- BSC placed front of toilet to use as grab bar  -GÓMEZ        Toileting Position  supported sitting;supported standing  -GÓMEZ        Comment (Toileting)  Pt. needed assist with back gown.  Pt. had to stand to wipe so OT placed BSC in front of toilet so she could brace self on when bending forward to wipe.  -GÓMEZ           BADL Safety/Performance    Impairments, BADL Safety/Performance  balance  -GÓMEZ        Skilled BADL Treatment/Intervention  cognitive/safety deficit modifications  -        Progress in BADL Status  independence level  -           General ROM    GENERAL ROM COMMENTS  WFL AROM  -GÓMEZ           MMT (Manual Muscle Testing)    General MMT Comments  Strength functional for all ADL and transfer task, BUE  -           Motor Assessment/Interventions    Additional Documentation  Balance (Group);Balance Interventions (Group);Fine Motor Testing & Training (Group)  -           Fine Motor Testing & Training    Comment, Fine Motor Coordination  pt. able to manipulate wipe, comb, sock, wash cloth all without droppage or diffculty.  -GÓMEZ           Sensory Assessment/Intervention    Sensory General Assessment  no sensation deficits identified pt. with no droppage or difficulty with  object use.  -GÓMEZ           Positioning and Restraints    Pre-Treatment Position  in bed  -GÓMEZ        Post Treatment Position  chair  -GÓMEZ        In Chair  reclined;call light within reach;encouraged to call for assist;exit alarm on;with other staff;notified nsg pt. requested break from compression on LE's  -GÓMEZ           Pain Assessment    Additional Documentation  Pain Scale: Numbers Pre/Post-Treatment (Group)  -GÓMEZ           Pain Scale: Numbers Pre/Post-Treatment    Pain Scale: Numbers, Pretreatment  5/10  -GÓMEZ        Pain Scale: Numbers, Post-Treatment  5/10  -GÓMEZ        Pain Location  head  -GÓMEZ        Pain Intervention(s)  Medication (See MAR)  -GÓMEZ           Plan of Care Review    Plan of Care Reviewed With  patient  -GÓMEZ           Clinical Impression (OT)    Date of Referral to OT  06/06/19  -GÓMEZ        OT Diagnosis  impaired ADL, IADL and transfer independence  -GÓMEZ        Patient/Family Goals Statement (OT Eval)  Pt. wants to return to PLOF, return  home today or tommorrow.  -GÓMEZ        Criteria for Skilled Therapeutic Interventions Met (OT Eval)  yes;treatment indicated  -GÓMEZ        Rehab Potential (OT Eval)  good, to achieve stated therapy goals  -GÓMEZ        Therapy Frequency (OT Eval)  daily  -GÓMEZ        Care Plan Review (OT)  evaluation/treatment results reviewed;care plan/treatment goals reviewed;risks/benefits reviewed;current/potential barriers reviewed;patient/other agree to care plan  -GÓMEZ        Anticipated Equipment Needs at Discharge (OT)  shower chair;front wheeled walker grab bars  -GÓMEZ        Anticipated Discharge Disposition (OT)  home with home health 1-2 visist  OT for home safety evaluation and recommenda.  -GÓMEZ        Patient/Family Concerns, Anticipated Discharge Disposition (OT)  pt. concerned for falls  -GÓMEZ           Vital Signs    Pre Systolic BP Rehab  140  -GÓMEZ        Pre Treatment Diastolic BP  82 recent BP  -GÓMEZ        Post Systolic BP Rehab  165  (Significant)   -GÓMEZ        Post Treatment  Diastolic BP  118  (Significant)  nurse alerted  -GÓMEZ        Pretreatment Heart Rate (beats/min)  84  -GÓMEZ        Posttreatment Heart Rate (beats/min)  81  -GÓMEZ        Pre SpO2 (%)  95  -GÓMEZ        O2 Delivery Pre Treatment  room air  -GÓMEZ        Post SpO2 (%)  95  -GÓMEZ        O2 Delivery Post Treatment  room air  -GÓMEZ        Pre Patient Position  Supine  -GÓMEZ        Intra Patient Position  Standing  -GÓMEZ        Post Patient Position  Sitting  -GÓMEZ           Planned OT Interventions    Planned Therapy Interventions (OT Eval)  transfer/mobility retraining;BADL retraining;occupation/activity based interventions;patient/caregiver education/training  -GÓMEZ           OT Goals    Transfer Goal Selection (OT)  transfer, OT goal 1  -GÓMEZ        Dressing Goal Selection (OT)  dressing, OT goal 1  -GÓMEZ        Toileting Goal Selection (OT)  toileting, OT goal 1  -GÓMEZ        Safety Awareness Goal Selection (OT)  safety awareness, OT goal 1  -GÓMEZ        Additional Documentation  Safety Awareness Goal Selection (OT) (Row)  -GÓMEZ           Transfer Goal 1 (OT)    Activity/Assistive Device (Transfer Goal 1, OT)  sit-to-stand/stand-to-sit;toilet;walker, rolling  -GÓMEZ        [place] Level/Cues Needed (Transfer Goal 1, OT)  supervision required  -GÓMEZ        Time Frame (Transfer Goal 1, OT)  long term goal (LTG);3 days  -GÓMEZ        Progress/Outcome (Transfer Goal 1, OT)  goal ongoing  -GÓMEZ           Dressing Goal 1 (OT)    Activity/Assistive Device (Dressing Goal 1, OT)  lower body dressing donning pants or undergarment  -GÓMEZ        [place]/Cues Needed (Dressing Goal 1, OT)  supervision required  -GÓMEZ        Time Frame (Dressing Goal 1, OT)  long term goal (LTG);3 days  -GÓMEZ        Progress/Outcome (Dressing Goal 1, OT)  goal ongoing  -GÓMEZ           Toileting Goal 1 (OT)    Activity/Device (Toileting Goal 1, OT)  toileting skills, all;grab bar/safety frame wx  -GÓMEZ        [place] Level/Cues Needed (Toileting Goal 1, OT)  supervision required  -GÓMEZ         Time Frame (Toileting Goal 1, OT)  long term goal (LTG);3 days  -GÓMEZ        Progress/Outcome (Toileting Goal 1, OT)  goal ongoing  -GÓMEZ           Safety Awareness Goal 1 (OT)    Activity (Safety Awareness Goal 1, OT)  demonstration of safe behaviors ADL's, transfers  -GÓMEZ        [place]/Cues/Accuracy (Safety Awareness Goal 1, OT)  independent able verbalize home safety recommendations  -GÓMEZ        Time Frame (Safety Awareness Goal 1, OT)  long term goal (LTG);3 days  -GÓMEZ        Progress/Outcome (Safety Awareness Goal 1, OT)  goal ongoing  -GÓMEZ           Living Environment    Home Accessibility  stairs to enter home  -GÓMEZ          User Key  (r) = Recorded By, (t) = Taken By, (c) = Cosigned By    Initials Name Effective Dates    Sera Bragg OT 06/08/18 -          Occupational Therapy Education     Title: PT OT SLP Therapies (In Progress)     Topic: Occupational Therapy (In Progress)     Point: ADL training (Done)     Description: Instruct learner(s) on proper safety adaptation and remediation techniques during self care or transfers.   Instruct in proper use of assistive devices.    Learning Progress Summary           Patient Acceptance, E, VU,NR by GÓMEZ at 6/7/2019  8:05 AM    Comment:  reason for consult, noted deficits, home safety recommendations ADL/trasnfers                   Point: Precautions (Done)     Description: Instruct learner(s) on prescribed precautions during self-care and functional transfers.    Learning Progress Summary           Patient Acceptance, E, VU,NR by GÓMEZ at 6/7/2019  8:05 AM    Comment:  reason for consult, noted deficits, home safety recommendations ADL/trasnfers                               User Key     Initials Effective Dates Name Provider Type Discipline    GÓMEZ 06/08/18 -  Sera Mendoza, OT Occupational Therapist OT                  OT Recommendation and Plan  Outcome Summary/Treatment Plan (OT)  Anticipated Equipment Needs at Discharge (OT): shower chair, front wheeled  walker(grab bars)  Anticipated Discharge Disposition (OT): home with home health(1-2 visist  OT for home safety evaluation and recommenda.)  Patient/Family Concerns, Anticipated Discharge Disposition (OT): pt. concerned for falls  Planned Therapy Interventions (OT Eval): transfer/mobility retraining, BADL retraining, occupation/activity based interventions, patient/caregiver education/training  Therapy Frequency (OT Eval): daily  Plan of Care Review  Plan of Care Reviewed With: patient  Plan of Care Reviewed With: patient  Outcome Summary: OT evaluation completed.  Pt. with headache on arrival, but developed increased nausea and dizziness on standing and performance of task.  Pt. wanting to hold to wall or IV pole with mobility.  Pt. will likely need AD for mobility safety.  OT made some home safety recommendations, such as grab bars and shower chair.  Pt. will benefit from continue  skilled OT services for further education on safety.  Pt. would benefit from 1-2 visits HH OT to work on home safety techniques and further recommendations.    Outcome Measures     Row Name 06/07/19 0805             How much help from another is currently needed...    Putting on and taking off regular lower body clothing?  3  -GÓMEZ      Bathing (including washing, rinsing, and drying)  3  -GÓMEZ      Toileting (which includes using toilet bed pan or urinal)  3  -GÓMEZ      Putting on and taking off regular upper body clothing  3  -GÓMEZ      Taking care of personal grooming (such as brushing teeth)  3  -GÓMEZ      Eating meals  4  -GÓMEZ      Score  19  -GÓMEZ         Functional Assessment    Outcome Measure Options  AM-PAC 6 Clicks Daily Activity (OT)  -GÓMEZ        User Key  (r) = Recorded By, (t) = Taken By, (c) = Cosigned By    Initials Name Provider Type    Sera Bragg, OT Occupational Therapist          Time Calculation:   Time Calculation- OT     Row Name 06/07/19 0805             Time Calculation- OT    OT Start Time  0805  -GÓMEZ      OT  Received On  06/07/19  -GÓMEZ      OT Goal Re-Cert Due Date  06/17/19  -GÓMEZ        User Key  (r) = Recorded By, (t) = Taken By, (c) = Cosigned By    Initials Name Provider Type    Sera Bragg, OT Occupational Therapist        Therapy Charges for Today     Code Description Service Date Service Provider Modifiers Qty    43796983592 HC OT EVAL LOW COMPLEXITY 4 6/7/2019 Sera Mendoza OT GO 1               Sera Mendoza OT  6/7/2019

## 2019-06-07 NOTE — SIGNIFICANT NOTE
Spoke with nurse regarding discontinuing Speech Pathology orders for Speech, Language, Cognitive Evaluation as patient dx ruled as vertigo per neurologist. Patient no longer on stroke pathway per nursing.

## 2019-06-07 NOTE — PLAN OF CARE
Problem: Patient Care Overview  Goal: Plan of Care Review  Outcome: Ongoing (interventions implemented as appropriate)   06/07/19 3234   Coping/Psychosocial   Plan of Care Reviewed With patient   Plan of Care Review   Progress no change   OTHER   Outcome Summary Pt rested during the night, VSS. Prn meds given for nausea and headache. Will continue to monitor.

## 2019-06-07 NOTE — PROGRESS NOTES
Discharge Planning Assessment  [facility]     Patient Name: Andres Cortez  MRN: [id number]  Today's Date: 6/7/2019    Admit Date: 6/6/2019    Discharge Needs Assessment     Row Name 06/07/19 1056       Living Environment    Lives With  child(xiomara), adult    Name(s) of Who Lives With Patient  Martin Bueno    Current Living Arrangements  home/apartment/condo    Primary Care Provided by  self    Provides Primary Care For  no one    Family Caregiver if Needed  child(xiomara), adult    Family Caregiver Names  SonMartin    Quality of Family Relationships  supportive;involved;helpful    Able to Return to Prior Arrangements  yes    Living Arrangement Comments  Lives with adult son in mobile home with 4 steps at entrance       Resource/Environmental Concerns    Resource/Environmental Concerns  none    Transportation Concerns  car, none       Transition Planning    Patient/Family Anticipates Transition to  home with family    Patient/Family Anticipated Services at Transition  none    Transportation Anticipated  family or friend will provide       Discharge Needs Assessment    Readmission Within the Last 30 Days  no previous admission in last 30 days    Concerns to be Addressed  no discharge needs identified    Equipment Currently Used at Home  none    Anticipated Changes Related to Illness  none    Equipment Needed After Discharge  none        Discharge Plan     Row Name 06/07/19 1058       Plan    Plan  Plan is home with family at discharge    Patient/Family in Agreement with Plan  yes    Plan Comments  Met with patient in the room to initiate initial discharge planning.  Patient works as a nurse at [employer].  Presented to ED with dizziness, nausea and vomiting.  Recent death of mother.  Lives wtih 23 year old son in mobile home with 4 steps at entrance that she manages without difficulty.  No DME or home health involved.  PCP is Luann Myers.  Independent with ADL.  No needs at present.   Following     Final Discharge Disposition Code  01 - home or self-care        Destination      No service coordination in this encounter.      Durable Medical Equipment      No service coordination in this encounter.      Dialysis/Infusion      No service coordination in this encounter.      Home Medical Care      No service coordination in this encounter.      Therapy      No service coordination in this encounter.      Community Resources      No service coordination in this encounter.          Demographic Summary     Row Name 06/07/19 105       General Information    Admission Type  observation    Arrived From  emergency department    Referral Source  admission list    Reason for Consult  discharge planning    Preferred Language  English        Functional Status     Row Name 06/07/19 1056       Functional Status    Usual Activity Tolerance  good    Current Activity Tolerance  fair       Functional Status, IADL    Medications  independent    Meal Preparation  independent    Housekeeping  independent    Laundry  independent    Shopping  independent        Psychosocial    No documentation.       Abuse/Neglect    No documentation.       Legal    No documentation.       Substance Abuse    No documentation.       Patient Forms    No documentation.           Uma Pang RN

## 2019-07-30 ENCOUNTER — OFFICE VISIT (OUTPATIENT)
Dept: CARDIOLOGY | Facility: CLINIC | Age: 49
End: 2019-07-30

## 2019-07-30 VITALS
DIASTOLIC BLOOD PRESSURE: 77 MMHG | OXYGEN SATURATION: 97 % | WEIGHT: 261 LBS | BODY MASS INDEX: 41.95 KG/M2 | HEART RATE: 86 BPM | SYSTOLIC BLOOD PRESSURE: 112 MMHG | HEIGHT: 66 IN

## 2019-07-30 DIAGNOSIS — R00.2 PALPITATIONS: ICD-10-CM

## 2019-07-30 DIAGNOSIS — R06.02 SHORTNESS OF BREATH: ICD-10-CM

## 2019-07-30 DIAGNOSIS — R60.0 LOWER EXTREMITY EDEMA: Primary | ICD-10-CM

## 2019-07-30 PROCEDURE — 99214 OFFICE O/P EST MOD 30 MIN: CPT | Performed by: PHYSICIAN ASSISTANT

## 2019-07-30 RX ORDER — TIZANIDINE 4 MG/1
40 TABLET ORAL 3 TIMES DAILY PRN
COMMUNITY
Start: 2019-07-01

## 2019-07-30 RX ORDER — OLMESARTAN MEDOXOMIL 40 MG/1
40 TABLET ORAL DAILY
COMMUNITY

## 2019-07-30 RX ORDER — FUROSEMIDE 20 MG/1
20 TABLET ORAL 2 TIMES DAILY
Qty: 60 TABLET | Refills: 6 | Status: SHIPPED | OUTPATIENT
Start: 2019-07-30

## 2019-07-30 RX ORDER — HYDROCHLOROTHIAZIDE 25 MG/1
25 TABLET ORAL DAILY
COMMUNITY
End: 2021-03-30

## 2019-07-30 NOTE — PATIENT INSTRUCTIONS
"Fat and Cholesterol Restricted Eating Plan  Getting too much fat and cholesterol in your diet may cause health problems. Choosing the right foods helps keep your fat and cholesterol at normal levels. This can keep you from getting certain diseases.  Your doctor may recommend an eating plan that includes:  · Total fat: ______% or less of total calories a day.  · Saturated fat: ______% or less of total calories a day.  · Cholesterol: less than _________mg a day.  · Fiber: ______g a day.    What are tips for following this plan?  General tips  · Work with your doctor to lose weight if you need to.  · Avoid:  ? Foods with added sugar.  ? Fried foods.  ? Foods with partially hydrogenated oils.  · Limit alcohol intake to no more than 1 drink a day for nonpregnant women and 2 drinks a day for men. One drink equals 12 oz of beer, 5 oz of wine, or 1½ oz of hard liquor.  Reading food labels  · Check food labels for:  ? Trans fats.  ? Partially hydrogenated oils.  ? Saturated fat (g) in each serving.  ? Cholesterol (mg) in each serving.  ? Fiber (g) in each serving.  · Choose foods with healthy fats, such as:  ? Monounsaturated fats.  ? Polyunsaturated fats.  ? Omega-3 fats.  · Choose grain products that have whole grains. Look for the word \"whole\" as the first word in the ingredient list.  Cooking  · Cook foods using low-fat methods. These include baking, boiling, grilling, and broiling.  · Eat more home-cooked foods. Eat at restaurants and buffets less often.  · Avoid cooking using saturated fats, such as butter, cream, palm oil, palm kernel oil, and coconut oil.  Meal planning  · At meals, divide your plate into four equal parts:  ? Fill one-half of your plate with vegetables and green salads.  ? Fill one-fourth of your plate with whole grains.  ? Fill one-fourth of your plate with low-fat (lean) protein foods.  · Eat fish that is high in omega-3 fats at least two times a week. This includes mackerel, tuna, sardines, and " salmon.  · Eat foods that are high in fiber, such as whole grains, beans, apples, broccoli, carrots, peas, and barley.  Recommended foods  Grains  · Whole grains, such as whole wheat or whole grain breads, crackers, cereals, and pasta. Unsweetened oatmeal, bulgur, barley, quinoa, or brown rice. Corn or whole wheat flour tortillas.  Vegetables  · Fresh or frozen vegetables (raw, steamed, roasted, or grilled). Green salads.  Fruits  · All fresh, canned (in natural juice), or frozen fruits.  Meats and other protein foods  · Ground beef (85% or leaner), grass-fed beef, or beef trimmed of fat. Skinless chicken or turkey. Ground chicken or turkey. Pork trimmed of fat. All fish and seafood. Egg whites. Dried beans, peas, or lentils. Unsalted nuts or seeds. Unsalted canned beans. Nut butters without added sugar or oil.  Dairy  · Low-fat or nonfat dairy products, such as skim or 1% milk, 2% or reduced-fat cheeses, low-fat and fat-free ricotta or cottage cheese, or plain low-fat and nonfat yogurt.  Fats and oils  · Tub margarine without trans fats. Light or reduced-fat mayonnaise and salad dressings. Avocado. Olive, canola, sesame, or safflower oils.  The items listed above may not be a complete list of recommended foods or beverages. Contact your dietitian for more options.  Foods to avoid  Grains  · White bread. White pasta. White rice. Cornbread. Bagels, pastries, and croissants. Crackers and snack foods that contain trans fat and hydrogenated oils.  Vegetables  · Vegetables cooked in cheese, cream, or butter sauce. Fried vegetables.  Fruits  · Canned fruit in heavy syrup. Fruit in cream or butter sauce. Fried fruit.  Meats and other protein foods  · Fatty cuts of meat. Ribs, chicken wings, choi, sausage, bologna, salami, chitterlings, fatback, hot dogs, bratwurst, and packaged lunch meats. Liver and organ meats. Whole eggs and egg yolks. Chicken and turkey with skin. Fried meat.  Dairy  · Whole or 2% milk, cream,  half-and-half, and cream cheese. Whole milk cheeses. Whole-fat or sweetened yogurt. Full-fat cheeses. Nondairy creamers and whipped toppings. Processed cheese, cheese spreads, and cheese curds.  Beverages  · Alcohol. Sugar-sweetened drinks such as sodas, lemonade, and fruit drinks.  Fats and oils  · Butter, stick margarine, lard, shortening, ghee, or choi fat. Coconut, palm kernel, and palm oils.  Sweets and desserts  · Corn syrup, sugars, honey, and molasses. Candy. Jam and jelly. Syrup. Sweetened cereals. Cookies, pies, cakes, donuts, muffins, and ice cream.  The items listed above may not be a complete list of foods and beverages to avoid. Contact your dietitian for more information.  Summary  · Choosing the right foods helps keep your fat and cholesterol at normal levels. This can keep you from getting certain diseases.  · At meals, fill one-half of your plate with vegetables and green salads.  · Eat high-fiber foods, like whole grains, beans, apples, carrots, peas, and barley.  · Limit added sugar, saturated fats, alcohol, and fried foods.  This information is not intended to replace advice given to you by your health care provider. Make sure you discuss any questions you have with your health care provider.  Document Released: 06/18/2013 Document Revised: 09/04/2018 Document Reviewed: 09/04/2018  [employer] Interactive Patient Education © 2019 [employer]  BMI for Adults  Body mass index (BMI) is a number that is calculated from a person's weight and height. In most adults, the number is used to find how much of an adult's weight is made up of fat. BMI is not as accurate as a direct measure of body fat.  How is BMI calculated?  BMI is calculated by dividing weight in kilograms by height in meters squared. It can also be calculated by dividing weight in pounds by height in inches squared, then multiplying the resulting number by 703. Charts are available to help you find your BMI quickly and easily without  doing this calculation.  How is BMI interpreted?  Health care professionals use BMI charts to identify whether an adult is underweight, at a normal weight, or overweight based on the following guidelines:  · Underweight: BMI less than 18.5.  · Normal weight: BMI between 18.5 and 24.9.  · Overweight: BMI between 25 and 29.9.  · Obese: BMI of 30 and above.    BMI is usually interpreted the same for males and females.  Weight includes both fat and muscle, so someone with a muscular build, such as an athlete, may have a BMI that is higher than 24.9. In cases like these, BMI may not accurately depict body fat. To determine if excess body fat is the cause of a BMI of 25 or higher, further assessments may need to be done by a health care provider.  Why is BMI a useful tool?  BMI is used to identify a possible weight problem that may be related to a medical problem or may increase the risk for medical problems. BMI can also be used to promote changes to reach a healthy weight.  This information is not intended to replace advice given to you by your health care provider. Make sure you discuss any questions you have with your health care provider.  Document Released: 08/29/2005 Document Revised: 04/27/2017 Document Reviewed: 05/15/2015  [employer] Interactive Patient Education © 2018 [employer]

## 2019-07-30 NOTE — PROGRESS NOTES
Problem list     Subjective   Andres Cortze is a 49 y.o. female     Chief Complaint   Patient presents with   • Hypertension       HPI    Patient is a 49-year-old female who presents to the office for evaluation.  Over the last month, patient was hospitalized with acute onset of vertigo.  Because of persistent symptoms, she underwent evaluation at [facility] in [place].  Work-up included MRI and embolic work-up to exclude stroke which was negative.  Patient was diagnosed with acute labyrinthitis and treated and discharged.    Patient describes being under much pressure and stress over the last several weeks.  She describes losing her mother.  She also describes other family issues that have caused her to be very anxious.    She does not describe any chest pain.  Shortness of breath is mild when exerting but no progressive dyspnea.  She can have normal functional status.  She has no PND orthopnea.    She does notice palpitations.  She notes that whenever she takes beta-blocker therapy that it helps control her palpitations.  She has no presyncope or syncope.  Otherwise patient is doing well      Outpatient Encounter Medications as of 7/30/2019   Medication Sig Dispense Refill   • aspirin 81 MG tablet Take 1 tablet by mouth Daily. 30 tablet 6   • buPROPion XL (WELLBUTRIN XL) 150 MG 24 hr tablet Take 150 mg by mouth Every Morning.     • busPIRone (BUSPAR) 15 MG tablet Take 15 mg by mouth 2 (Two) Times a Day.     • CloNIDine (CATAPRES) 0.1 MG tablet Take 0.05-0.1 mg by mouth 2 (Two) Times a Day.     • furosemide (LASIX) 20 MG tablet Take 1 tablet by mouth 2 (Two) Times a Day. 60 tablet 6   • hydrochlorothiazide (HYDRODIURIL) 25 MG tablet Take 25 mg by mouth Daily.     • levothyroxine (SYNTHROID, LEVOTHROID) 100 MCG tablet Take 100 mcg by mouth Daily.     • meclizine (ANTIVERT) 25 MG tablet Take 1 tablet by mouth 3 (Three) Times a Day As Needed for dizziness. 30 tablet 0   • metoprolol succinate XL (TOPROL-XL) 50  MG 24 hr tablet Take 50 mg by mouth Daily.     • nitroglycerin (NITROSTAT) 0.4 MG SL tablet Place 5 tablets under the tongue every 5 (five) minutes as needed. Place 1 tablet under the tongue every 5 minutes for up to 3 doses as needed for chest pain. If pain persists, call 911     • olmesartan (BENICAR) 40 MG tablet Take 40 mg by mouth Daily.     • ondansetron (ZOFRAN) 4 MG tablet Take 1 tablet by mouth Every 8 (Eight) Hours As Needed for Nausea or Vomiting. 30 tablet 0   • potassium chloride (MICRO-K) 10 MEQ CR capsule 10 mEq Daily.     • sertraline (ZOLOFT) 50 MG tablet Take 50 mg by mouth Every Night.     • tiZANidine (ZANAFLEX) 4 MG tablet Take 40 mg by mouth 3 (Three) Times a Day As Needed.     • [DISCONTINUED] furosemide (LASIX) 20 MG tablet Take 20 mg by mouth Daily.     • olmesartan-hydrochlorothiazide (BENICAR HCT) 40-25 MG per tablet Take 1 tablet by mouth Daily.       No facility-administered encounter medications on file as of 7/30/2019.        Patient has no known allergies.    Past Medical History:   Diagnosis Date   • Anxiety    • Depression    • Disease of thyroid gland    • Hypertension    • Hypothyroid    • Panic attacks    • Vertigo, benign paroxysmal        Social History     Socioeconomic History   • Marital status:      Spouse name: Not on file   • Number of children: Not on file   • Years of education: Not on file   • Highest education level: Not on file   Tobacco Use   • Smoking status: Former Smoker   • Smokeless tobacco: Never Used   Substance and Sexual Activity   • Alcohol use: No   • Drug use: No   • Sexual activity: Defer       Family History   Problem Relation Age of Onset   • Stroke Mother    • Heart attack Mother    • Heart failure Father    • Diabetes Father    • Heart failure Brother        Review of Systems   Constitutional: Positive for fatigue (stays tired ). Negative for chills and fever.   HENT: Negative.    Eyes: Negative.  Negative for visual disturbance.  "  Respiratory: Positive for shortness of breath (SOA with flutters ). Negative for chest tightness.    Cardiovascular: Positive for palpitations (Occasional flutters ) and leg swelling (LE edema which doesn't always resolve overnight. Left worse than right ). Negative for chest pain.   Gastrointestinal: Positive for nausea (with vertigo ).   Endocrine: Negative.  Negative for cold intolerance and heat intolerance.   Genitourinary: Negative.    Musculoskeletal: Negative.  Negative for arthralgias and back pain.   Skin: Negative.  Negative for rash and wound.   Allergic/Immunologic: Negative.  Negative for environmental allergies and food allergies.   Neurological: Positive for dizziness (Vertigo, takes Meclizine prn ) and light-headedness. Negative for weakness.   Hematological: Bruises/bleeds easily (Bleeds easily ).   Psychiatric/Behavioral: Negative.  Negative for sleep disturbance (Denies waking with smothering or SOA).   All other systems reviewed and are negative.      Objective   Vitals:    07/30/19 1453   BP: 112/77   BP Location: Left arm   Patient Position: Sitting   Pulse: 86   SpO2: 97%   Weight: 118 kg (261 lb)   Height: 167.6 cm (66\")      /77 (BP Location: Left arm, Patient Position: Sitting)   Pulse 86   Ht 167.6 cm (66\")   Wt 118 kg (261 lb)   SpO2 97%   BMI 42.13 kg/m²     Lab Results (most recent)     None          Physical Exam   Constitutional: She is oriented to person, place, and time. She appears well-developed and well-nourished. No distress.   HENT:   Head: Normocephalic and atraumatic.   Eyes: EOM are normal. Pupils are equal, round, and reactive to light.   Neck: No JVD present.   Cardiovascular: Normal rate, regular rhythm, normal heart sounds and intact distal pulses. Exam reveals no gallop and no friction rub.   No murmur heard.  Pulmonary/Chest: Effort normal and breath sounds normal. No respiratory distress. She has no wheezes. She has no rales. She exhibits no tenderness. "   Musculoskeletal: Normal range of motion. She exhibits no edema.   Neurological: She is alert and oriented to person, place, and time. No cranial nerve deficit.   Skin: Skin is warm and dry. No rash noted. No erythema. No pallor.   Psychiatric: She has a normal mood and affect. Her behavior is normal.   Nursing note and vitals reviewed.      Procedure   Procedures       Assessment/Plan     Problems Addressed this Visit        Cardiovascular and Mediastinum    Palpitations       Respiratory    Shortness of breath       Other    Lower extremity edema - Primary    Relevant Orders    Basic Metabolic Panel          Recommendation  1.  Patient is doing well.  She had echocardiogram in [facility] in [place] which was largely normal.  She does have occasional lower extremity edema which worsens throughout the day.  I feel that this likely represents venous insufficiency.  I am increasing her Lasix as she would like for this to be increased.  I am checking labs in 1 week to ensure that she has no azotemia.  2.  Otherwise palpitations are minimal.  We discussed event monitoring but she does not want to do event monitoring at this time.  She is doing well on beta-blocker therapy and blood pressure is controlled.  3.  Otherwise I feel she is doing well.  She has no chest pain.  We will see her back for follow-up and will 1 to 2 months.  She will follow with primary as scheduled         Patient's Body mass index is 42.13 kg/m². BMI is above normal parameters. Recommendations include: educational material and referral to primary care.       Electronically signed by:

## 2019-11-05 ENCOUNTER — TELEPHONE (OUTPATIENT)
Dept: CARDIOLOGY | Facility: CLINIC | Age: 49
End: 2019-11-05

## 2020-06-16 ENCOUNTER — TRANSCRIBE ORDERS (OUTPATIENT)
Dept: INFUSION THERAPY | Facility: HOSPITAL | Age: 50
End: 2020-06-16

## 2020-06-16 DIAGNOSIS — R56.9 SEIZURES (HCC): Primary | ICD-10-CM

## 2020-07-02 ENCOUNTER — TRANSCRIBE ORDERS (OUTPATIENT)
Dept: INFUSION THERAPY | Facility: HOSPITAL | Age: 50
End: 2020-07-02

## 2020-07-02 ENCOUNTER — HOSPITAL ENCOUNTER (OUTPATIENT)
Dept: INFUSION THERAPY | Facility: HOSPITAL | Age: 50
Discharge: HOME | End: 2020-07-02
Admitting: PSYCHIATRY & NEUROLOGY

## 2020-07-02 DIAGNOSIS — R56.9 GENERALIZED-ONSET SEIZURES (HCC): Primary | ICD-10-CM

## 2020-07-02 PROCEDURE — 95819 EEG AWAKE AND ASLEEP: CPT

## 2020-07-16 ENCOUNTER — HOSPITAL ENCOUNTER (OUTPATIENT)
Dept: INFUSION THERAPY | Facility: HOSPITAL | Age: 50
Discharge: HOME | End: 2020-07-16
Admitting: PSYCHIATRY & NEUROLOGY

## 2020-07-16 ENCOUNTER — TRANSCRIBE ORDERS (OUTPATIENT)
Dept: INFUSION THERAPY | Facility: HOSPITAL | Age: 50
End: 2020-07-16

## 2020-07-16 DIAGNOSIS — R56.9 SEIZURE (HCC): Primary | ICD-10-CM

## 2020-07-16 PROCEDURE — 95819 EEG AWAKE AND ASLEEP: CPT

## 2020-07-20 ENCOUNTER — TRANSCRIBE ORDERS (OUTPATIENT)
Dept: ADMINISTRATIVE | Facility: HOSPITAL | Age: 50
End: 2020-07-20

## 2020-07-20 DIAGNOSIS — R42 DIZZINESS AND GIDDINESS: Primary | ICD-10-CM

## 2020-07-23 ENCOUNTER — APPOINTMENT (OUTPATIENT)
Dept: CARDIOLOGY | Facility: HOSPITAL | Age: 50
End: 2020-07-23

## 2020-07-30 ENCOUNTER — HOSPITAL ENCOUNTER (OUTPATIENT)
Dept: CARDIOLOGY | Facility: HOSPITAL | Age: 50
Discharge: HOME | End: 2020-07-30

## 2020-07-30 ENCOUNTER — HOSPITAL ENCOUNTER (OUTPATIENT)
Dept: GENERAL RADIOLOGY | Facility: HOSPITAL | Age: 50
Discharge: HOME | End: 2020-07-30

## 2020-07-30 ENCOUNTER — TRANSCRIBE ORDERS (OUTPATIENT)
Dept: ADMINISTRATIVE | Facility: HOSPITAL | Age: 50
End: 2020-07-30

## 2020-07-30 ENCOUNTER — HOSPITAL ENCOUNTER (OUTPATIENT)
Dept: INFUSION THERAPY | Facility: HOSPITAL | Age: 50
Discharge: HOME | End: 2020-07-30
Admitting: PSYCHIATRY & NEUROLOGY

## 2020-07-30 DIAGNOSIS — M54.50 LOW BACK PAIN, UNSPECIFIED BACK PAIN LATERALITY, UNSPECIFIED CHRONICITY, UNSPECIFIED WHETHER SCIATICA PRESENT: ICD-10-CM

## 2020-07-30 DIAGNOSIS — R42 DIZZINESS AND GIDDINESS: ICD-10-CM

## 2020-07-30 DIAGNOSIS — M54.2 CERVICALGIA: ICD-10-CM

## 2020-07-30 DIAGNOSIS — M54.2 CERVICALGIA: Primary | ICD-10-CM

## 2020-07-30 PROCEDURE — 72110 X-RAY EXAM L-2 SPINE 4/>VWS: CPT

## 2020-07-30 PROCEDURE — 72050 X-RAY EXAM NECK SPINE 4/5VWS: CPT

## 2020-07-30 PROCEDURE — 95819 EEG AWAKE AND ASLEEP: CPT

## 2020-07-30 PROCEDURE — 93880 EXTRACRANIAL BILAT STUDY: CPT | Performed by: RADIOLOGY

## 2020-07-30 PROCEDURE — 72110 X-RAY EXAM L-2 SPINE 4/>VWS: CPT | Performed by: RADIOLOGY

## 2020-07-30 PROCEDURE — 72050 X-RAY EXAM NECK SPINE 4/5VWS: CPT | Performed by: RADIOLOGY

## 2020-07-30 PROCEDURE — 93880 EXTRACRANIAL BILAT STUDY: CPT

## 2020-08-20 ENCOUNTER — HOSPITAL ENCOUNTER (OUTPATIENT)
Dept: GENERAL RADIOLOGY | Facility: HOSPITAL | Age: 50
Discharge: HOME | End: 2020-08-20

## 2020-08-20 ENCOUNTER — TRANSCRIBE ORDERS (OUTPATIENT)
Dept: ADMINISTRATIVE | Facility: HOSPITAL | Age: 50
End: 2020-08-20

## 2020-08-20 ENCOUNTER — HOSPITAL ENCOUNTER (OUTPATIENT)
Dept: GENERAL RADIOLOGY | Facility: HOSPITAL | Age: 50
Discharge: HOME | End: 2020-08-20
Admitting: PSYCHIATRY & NEUROLOGY

## 2020-08-20 DIAGNOSIS — M54.50 LOW BACK PAIN, UNSPECIFIED BACK PAIN LATERALITY, UNSPECIFIED CHRONICITY, UNSPECIFIED WHETHER SCIATICA PRESENT: ICD-10-CM

## 2020-08-20 DIAGNOSIS — M54.50 LOW BACK PAIN, UNSPECIFIED BACK PAIN LATERALITY, UNSPECIFIED CHRONICITY, UNSPECIFIED WHETHER SCIATICA PRESENT: Primary | ICD-10-CM

## 2020-08-20 PROCEDURE — 72110 X-RAY EXAM L-2 SPINE 4/>VWS: CPT

## 2020-08-20 PROCEDURE — 72110 X-RAY EXAM L-2 SPINE 4/>VWS: CPT | Performed by: RADIOLOGY

## 2020-08-20 PROCEDURE — 72072 X-RAY EXAM THORAC SPINE 3VWS: CPT | Performed by: RADIOLOGY

## 2020-08-20 PROCEDURE — 72072 X-RAY EXAM THORAC SPINE 3VWS: CPT

## 2020-12-07 ENCOUNTER — OFFICE VISIT (OUTPATIENT)
Dept: ORTHOPEDIC SURGERY | Facility: CLINIC | Age: 50
End: 2020-12-07

## 2020-12-07 VITALS
SYSTOLIC BLOOD PRESSURE: 166 MMHG | TEMPERATURE: 97.7 F | HEIGHT: 66 IN | BODY MASS INDEX: 42.43 KG/M2 | DIASTOLIC BLOOD PRESSURE: 89 MMHG | HEART RATE: 86 BPM | WEIGHT: 264 LBS

## 2020-12-07 DIAGNOSIS — G56.03 CARPAL TUNNEL SYNDROME, BILATERAL: Primary | ICD-10-CM

## 2020-12-07 PROCEDURE — 99203 OFFICE O/P NEW LOW 30 MIN: CPT | Performed by: ORTHOPAEDIC SURGERY

## 2020-12-07 RX ORDER — DULOXETIN HYDROCHLORIDE 30 MG/1
30 CAPSULE, DELAYED RELEASE ORAL DAILY
COMMUNITY
End: 2021-03-30

## 2020-12-07 RX ORDER — PREGABALIN 100 MG/1
225 CAPSULE ORAL 2 TIMES DAILY
COMMUNITY
End: 2021-03-30
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
## 2020-12-10 PROBLEM — G56.03 CARPAL TUNNEL SYNDROME, BILATERAL: Status: ACTIVE | Noted: 2020-12-10

## 2021-01-06 ENCOUNTER — OFFICE VISIT (OUTPATIENT)
Dept: ORTHOPEDIC SURGERY | Facility: CLINIC | Age: 51
End: 2021-01-06

## 2021-01-06 VITALS
HEART RATE: 89 BPM | WEIGHT: 264 LBS | SYSTOLIC BLOOD PRESSURE: 128 MMHG | TEMPERATURE: 97.1 F | DIASTOLIC BLOOD PRESSURE: 82 MMHG | BODY MASS INDEX: 42.43 KG/M2 | HEIGHT: 66 IN

## 2021-01-06 DIAGNOSIS — Z01.818 PREOPERATIVE TESTING: ICD-10-CM

## 2021-01-06 DIAGNOSIS — G56.01 CARPAL TUNNEL SYNDROME, RIGHT: Primary | ICD-10-CM

## 2021-01-06 PROCEDURE — 99214 OFFICE O/P EST MOD 30 MIN: CPT | Performed by: ORTHOPAEDIC SURGERY

## 2021-01-06 RX ORDER — PROMETHAZINE HYDROCHLORIDE 25 MG/1
TABLET ORAL
COMMUNITY
Start: 2020-12-10

## 2021-01-06 RX ORDER — LORAZEPAM 0.5 MG/1
0.5 TABLET ORAL 2 TIMES DAILY PRN
COMMUNITY
Start: 2020-12-08 | End: 2021-03-30

## 2021-01-06 RX ORDER — TRAZODONE HYDROCHLORIDE 100 MG/1
100 TABLET ORAL NIGHTLY PRN
COMMUNITY
Start: 2020-12-17 | End: 2021-03-30

## 2021-01-06 RX ORDER — CHLORAL HYDRATE 500 MG
1000 CAPSULE ORAL 2 TIMES DAILY WITH MEALS
COMMUNITY

## 2021-01-06 RX ORDER — GEMFIBROZIL 600 MG/1
600 TABLET, FILM COATED ORAL DAILY
COMMUNITY
Start: 2020-12-29

## 2021-01-06 RX ORDER — MELOXICAM 15 MG/1
15 TABLET ORAL
COMMUNITY
Start: 2020-12-18

## 2021-01-06 RX ORDER — ATORVASTATIN CALCIUM 10 MG/1
10 TABLET, FILM COATED ORAL DAILY
COMMUNITY
Start: 2020-12-29

## 2021-01-06 NOTE — H&P (VIEW-ONLY)
History & Physical         Patient: Andres Cortez  YOB: 1970  Date of Encounter: 01/06/2021      Chief  Complaint:   Chief Complaint   Patient presents with   • Right Wrist - Follow-up, Pain, Numbness, Tingling   • Left Wrist - Follow-up, Pain, Numbness, Tingling           HPI:  Andres Cortez, 50 y.o. female presents in follow-up with bilateral hand pain right greater than left and nerve conduction study proven carpal tunnel syndrome bilaterally.  He describes a burning sensation with symptoms greater in the thumb and index finger.  She is known to have thyroid disease.  She relates history of pain significant at night which awakens her.  She has had cervical spine injury 2009 and by her description she underwent cervical spine fusion at the C6-C7 level.  Presents today having obtained medical clearance.        Medical History:  Patient Active Problem List   Diagnosis   • Chest pain   • Shortness of breath   • Essential hypertension   • Abnormal stress test   • Lower extremity edema   • Disease of thyroid gland   • Former tobacco use   • Vertigo   • Palpitations   • Carpal tunnel syndrome, bilateral     Past Medical History:   Diagnosis Date   • Anxiety    • Depression    • Disease of thyroid gland    • Hypertension    • Hypothyroid    • Neuropathy    • Panic attacks    • Sleep apnea    • Vertigo, benign paroxysmal      Past Surgical History:   Past Surgical History:   Procedure Laterality Date   • BACK SURGERY     • CHOLECYSTECTOMY     • NECK SURGERY       Family History:  Family History   Problem Relation Age of Onset   • Stroke Mother    • Heart attack Mother    • Heart failure Father    • Diabetes Father    • Heart failure Brother        Current Medications:    Current Outpatient Medications:   •  aspirin 81 MG tablet, Take 1 tablet by mouth Daily., Disp: 30 tablet, Rfl: 6  •  atorvastatin (LIPITOR) 10 MG tablet, Take 10 mg by mouth Daily., Disp: , Rfl:   •  CloNIDine (CATAPRES) 0.1 MG  tablet, Take 0.05-0.1 mg by mouth 2 (Two) Times a Day., Disp: , Rfl:   •  DULoxetine (CYMBALTA) 30 MG capsule, Take 30 mg by mouth Daily. 60mg in am and 30mg in pm, Disp: , Rfl:   •  furosemide (LASIX) 20 MG tablet, Take 1 tablet by mouth 2 (Two) Times a Day., Disp: 60 tablet, Rfl: 6  •  gemfibrozil (LOPID) 600 MG tablet, Take 600 mg by mouth Daily., Disp: , Rfl:   •  hydrochlorothiazide (HYDRODIURIL) 25 MG tablet, Take 25 mg by mouth Daily., Disp: , Rfl:   •  levothyroxine (SYNTHROID, LEVOTHROID) 100 MCG tablet, Take 100 mcg by mouth Daily., Disp: , Rfl:   •  LORazepam (ATIVAN) 0.5 MG tablet, Take 0.5 mg by mouth 2 (Two) Times a Day As Needed. for anxiety, Disp: , Rfl:   •  meclizine (ANTIVERT) 25 MG tablet, Take 1 tablet by mouth 3 (Three) Times a Day As Needed for dizziness., Disp: 30 tablet, Rfl: 0  •  meloxicam (MOBIC) 15 MG tablet, Take 15 mg by mouth every night at bedtime., Disp: , Rfl:   •  metoprolol succinate XL (TOPROL-XL) 50 MG 24 hr tablet, Take 50 mg by mouth Daily., Disp: , Rfl:   •  olmesartan (BENICAR) 40 MG tablet, Take 40 mg by mouth Daily., Disp: , Rfl:   •  Omega-3 Fatty Acids (fish oil) 1000 MG capsule capsule, Take 1,000 mg by mouth 2 (Two) Times a Day With Meals., Disp: , Rfl:   •  ondansetron (ZOFRAN) 4 MG tablet, Take 1 tablet by mouth Every 8 (Eight) Hours As Needed for Nausea or Vomiting., Disp: 30 tablet, Rfl: 0  •  potassium chloride (MICRO-K) 10 MEQ CR capsule, 10 mEq Daily., Disp: , Rfl:   •  pregabalin (LYRICA) 100 MG capsule, Take 225 mg by mouth 2 (Two) Times a Day., Disp: , Rfl:   •  promethazine (PHENERGAN) 25 MG tablet, TAKE 1 TABLET BY MOUTH EVERY 4 TO 6 HOURS AS NEEDED FORNAUSEA AND VOMITING, Disp: , Rfl:   •  tiZANidine (ZANAFLEX) 4 MG tablet, Take 40 mg by mouth 3 (Three) Times a Day As Needed., Disp: , Rfl:   •  traZODone (DESYREL) 100 MG tablet, Take 100 mg by mouth At Night As Needed., Disp: , Rfl:   •  vitamin D3 125 MCG (5000 UT) capsule capsule, Take 1 capsule by  mouth Daily., Disp: , Rfl:     Allergies:  No Known Allergies      Social History:  Social History     Socioeconomic History   • Marital status:      Spouse name: Not on file   • Number of children: Not on file   • Years of education: Not on file   • Highest education level: Not on file   Tobacco Use   • Smoking status: Former Smoker   • Smokeless tobacco: Never Used   Substance and Sexual Activity   • Alcohol use: No   • Drug use: No   • Sexual activity: Defer       Review of Systems:  Review of Systems   HENT: Negative.    Eyes: Negative.    Respiratory: Positive for apnea.    Cardiovascular: Negative.    Gastrointestinal: Negative.    Endocrine: Negative.    Genitourinary: Negative.    Musculoskeletal: Positive for arthralgias, back pain, gait problem, joint swelling and neck pain.   Skin: Negative.    Allergic/Immunologic: Negative.    Neurological: Positive for weakness and numbness.        Vertigo   Hematological: Negative.    Psychiatric/Behavioral: Positive for dysphoric mood and sleep disturbance. The patient is nervous/anxious.          EMG/NCS:  EMG nerve conduction studies by report describe moderate median neuropathy right greater than left.  No evidence of cervical myelopathy or radiculopathy.    Physical Examination:  Physical Exam  Vitals signs and nursing note reviewed.   Constitutional:       General: She is not in acute distress.     Appearance: She is not diaphoretic.   HENT:      Head: Normocephalic and atraumatic.      Right Ear: External ear normal.      Left Ear: External ear normal.   Eyes:      General:         Right eye: No discharge.         Left eye: No discharge.      Conjunctiva/sclera: Conjunctivae normal.   Neck:      Musculoskeletal: Normal range of motion and neck supple.   Cardiovascular:      Rate and Rhythm: Normal rate and regular rhythm.      Heart sounds: Normal heart sounds. No murmur.   Pulmonary:      Effort: Pulmonary effort is normal. No respiratory distress.      " Breath sounds: Normal breath sounds. No wheezing.   Abdominal:      General: There is no distension.      Palpations: Abdomen is soft.      Tenderness: There is no guarding.   Skin:     General: Skin is warm and dry.      Capillary Refill: Capillary refill takes less than 2 seconds.   Neurological:      Mental Status: She is alert and oriented to person, place, and time.   Psychiatric:         Behavior: Behavior normal.         Thought Content: Thought content normal.         Judgment: Judgment normal.           Examination:   Examination right hand reveals normal thenar tone compared to the left she has full motion negative grind negative Finkelstein's test with diminished sensation thumb and index fingers and moderately positive Phalen sign.    Vital Signs:  Vitals:    01/06/21 1021   BP: 128/82   Pulse: 89   Temp: 97.1 °F (36.2 °C)   Weight: 120 kg (264 lb)   Height: 167.6 cm (66\")         Assessment & Plan:   50 y.o. female presents with continued right hand complaints with clinical findings supporting diagnosis of carpal tunnel syndrome supported by her EMG nerve conduction studies.  As she has now received medical clearance we will proceed with proposed surgery to be performed with local anesthetic and MAC.         Diagnosis Plan   1. Carpal tunnel syndrome, right  Follow Anesthesia Guidelines / Protocol    Obtain Informed Consent    Follow Anesthesia Guidelines / Protocol    Case Request    Provide Instructions to Patient Regarding NPO Status    Chlorhexidine Skin Prep - Educate and Review With Patient    Verify NPO Status    Obtain Informed Consent (If Not Done Inpatient or PAT)    COVID PRE-OP / PRE-PROCEDURE SCREENING ORDER (NO ISOLATION) - Swab, Nasopharynx    Case Request   2. Preoperative testing  COVID PRE-OP / PRE-PROCEDURE SCREENING ORDER (NO ISOLATION) - Swab, Nasopharynx               Cc:  Katlyn Cavanaugh APRN              This document has been electronically signed by Fitz Curry MD   "   January 6, 2021 14:20 EST

## 2021-01-07 ENCOUNTER — TELEPHONE (OUTPATIENT)
Dept: ORTHOPEDIC SURGERY | Facility: CLINIC | Age: 51
End: 2021-01-07

## 2021-01-19 ENCOUNTER — LAB (OUTPATIENT)
Dept: LAB | Facility: HOSPITAL | Age: 51
End: 2021-01-19

## 2021-01-19 ENCOUNTER — APPOINTMENT (OUTPATIENT)
Dept: PREADMISSION TESTING | Facility: HOSPITAL | Age: 51
End: 2021-01-19

## 2021-01-19 DIAGNOSIS — Z01.818 PRE-OPERATIVE CLEARANCE: Primary | ICD-10-CM

## 2021-01-19 DIAGNOSIS — G56.01 CARPAL TUNNEL SYNDROME, RIGHT: ICD-10-CM

## 2021-01-19 LAB
ANION GAP SERPL CALCULATED.3IONS-SCNC: 9.4 MMOL/L (ref 5–15)
BUN SERPL-MCNC: 8 MG/DL (ref 6–20)
BUN/CREAT SERPL: 10.8 (ref 7–25)
CALCIUM SPEC-SCNC: 9.5 MG/DL (ref 8.6–10.5)
CHLORIDE SERPL-SCNC: 101 MMOL/L (ref 98–107)
CO2 SERPL-SCNC: 27.6 MMOL/L (ref 22–29)
CREAT SERPL-MCNC: 0.74 MG/DL (ref 0.57–1)
DEPRECATED RDW RBC AUTO: 52.1 FL (ref 37–54)
ERYTHROCYTE [DISTWIDTH] IN BLOOD BY AUTOMATED COUNT: 15.9 % (ref 12.3–15.4)
GFR SERPL CREATININE-BSD FRML MDRD: 83 ML/MIN/1.73
GLUCOSE SERPL-MCNC: 138 MG/DL (ref 65–99)
HCT VFR BLD AUTO: 44.8 % (ref 34–46.6)
HGB BLD-MCNC: 14.1 G/DL (ref 12–15.9)
MCH RBC QN AUTO: 28.1 PG (ref 26.6–33)
MCHC RBC AUTO-ENTMCNC: 31.5 G/DL (ref 31.5–35.7)
MCV RBC AUTO: 89.4 FL (ref 79–97)
PLATELET # BLD AUTO: 484 10*3/MM3 (ref 140–450)
PMV BLD AUTO: 9.3 FL (ref 6–12)
POTASSIUM SERPL-SCNC: 4 MMOL/L (ref 3.5–5.2)
RBC # BLD AUTO: 5.01 10*6/MM3 (ref 3.77–5.28)
SODIUM SERPL-SCNC: 138 MMOL/L (ref 136–145)
WBC # BLD AUTO: 14.2 10*3/MM3 (ref 3.4–10.8)

## 2021-01-19 PROCEDURE — 93005 ELECTROCARDIOGRAM TRACING: CPT

## 2021-01-19 PROCEDURE — C9803 HOPD COVID-19 SPEC COLLECT: HCPCS

## 2021-01-19 PROCEDURE — U0004 COV-19 TEST NON-CDC HGH THRU: HCPCS | Performed by: ORTHOPAEDIC SURGERY

## 2021-01-19 PROCEDURE — 85027 COMPLETE CBC AUTOMATED: CPT

## 2021-01-19 PROCEDURE — 80048 BASIC METABOLIC PNL TOTAL CA: CPT

## 2021-01-19 PROCEDURE — 36415 COLL VENOUS BLD VENIPUNCTURE: CPT

## 2021-01-19 PROCEDURE — 93010 ELECTROCARDIOGRAM REPORT: CPT | Performed by: INTERNAL MEDICINE

## 2021-01-19 NOTE — DISCHARGE INSTRUCTIONS

## 2021-01-20 LAB
QT INTERVAL: 366 MS
QTC INTERVAL: 432 MS
SARS-COV-2 RNA RESP QL NAA+PROBE: NOT DETECTED

## 2021-01-21 ENCOUNTER — ANESTHESIA EVENT (OUTPATIENT)
Dept: PERIOP | Facility: HOSPITAL | Age: 51
End: 2021-01-21

## 2021-01-21 ENCOUNTER — ANESTHESIA (OUTPATIENT)
Dept: PERIOP | Facility: HOSPITAL | Age: 51
End: 2021-01-21

## 2021-01-21 ENCOUNTER — HOSPITAL ENCOUNTER (OUTPATIENT)
Facility: HOSPITAL | Age: 51
Setting detail: HOSPITAL OUTPATIENT SURGERY
Discharge: HOME | End: 2021-01-21
Attending: ORTHOPAEDIC SURGERY | Admitting: ORTHOPAEDIC SURGERY

## 2021-01-21 VITALS
HEIGHT: 66 IN | SYSTOLIC BLOOD PRESSURE: 120 MMHG | RESPIRATION RATE: 14 BRPM | TEMPERATURE: 97.3 F | WEIGHT: 277.8 LBS | HEART RATE: 79 BPM | DIASTOLIC BLOOD PRESSURE: 82 MMHG | BODY MASS INDEX: 44.65 KG/M2 | OXYGEN SATURATION: 99 %

## 2021-01-21 DIAGNOSIS — G56.01 CARPAL TUNNEL SYNDROME, RIGHT: ICD-10-CM

## 2021-01-21 LAB
B-HCG UR QL: NEGATIVE
INTERNAL NEGATIVE CONTROL: NEGATIVE
INTERNAL POSITIVE CONTROL: POSITIVE
Lab: NORMAL

## 2021-01-21 PROCEDURE — 25010000003 LIDOCAINE 1 % SOLUTION: Performed by: ORTHOPAEDIC SURGERY

## 2021-01-21 PROCEDURE — 25010000002 FENTANYL CITRATE (PF) 100 MCG/2ML SOLUTION: Performed by: NURSE ANESTHETIST, CERTIFIED REGISTERED

## 2021-01-21 PROCEDURE — 25010000002 PROPOFOL 10 MG/ML EMULSION: Performed by: NURSE ANESTHETIST, CERTIFIED REGISTERED

## 2021-01-21 PROCEDURE — 25010000002 MIDAZOLAM PER 1 MG: Performed by: NURSE ANESTHETIST, CERTIFIED REGISTERED

## 2021-01-21 PROCEDURE — 64721 CARPAL TUNNEL SURGERY: CPT | Performed by: ORTHOPAEDIC SURGERY

## 2021-01-21 PROCEDURE — 81025 URINE PREGNANCY TEST: CPT | Performed by: ANESTHESIOLOGY

## 2021-01-21 PROCEDURE — 25010000002 ONDANSETRON PER 1 MG: Performed by: NURSE ANESTHETIST, CERTIFIED REGISTERED

## 2021-01-21 PROCEDURE — 25010000002 KETOROLAC TROMETHAMINE PER 15 MG: Performed by: NURSE ANESTHETIST, CERTIFIED REGISTERED

## 2021-01-21 RX ORDER — IPRATROPIUM BROMIDE AND ALBUTEROL SULFATE 2.5; .5 MG/3ML; MG/3ML
3 SOLUTION RESPIRATORY (INHALATION) ONCE AS NEEDED
Status: DISCONTINUED | OUTPATIENT
Start: 2021-01-21 | End: 2021-01-21 | Stop reason: HOSPADM

## 2021-01-21 RX ORDER — SODIUM CHLORIDE, SODIUM LACTATE, POTASSIUM CHLORIDE, CALCIUM CHLORIDE 600; 310; 30; 20 MG/100ML; MG/100ML; MG/100ML; MG/100ML
INJECTION, SOLUTION INTRAVENOUS CONTINUOUS PRN
Status: DISCONTINUED | OUTPATIENT
Start: 2021-01-21 | End: 2021-01-21 | Stop reason: SURG

## 2021-01-21 RX ORDER — DROPERIDOL 2.5 MG/ML
0.62 INJECTION, SOLUTION INTRAMUSCULAR; INTRAVENOUS ONCE AS NEEDED
Status: DISCONTINUED | OUTPATIENT
Start: 2021-01-21 | End: 2021-01-21 | Stop reason: HOSPADM

## 2021-01-21 RX ORDER — FAMOTIDINE 10 MG/ML
INJECTION, SOLUTION INTRAVENOUS AS NEEDED
Status: DISCONTINUED | OUTPATIENT
Start: 2021-01-21 | End: 2021-01-21 | Stop reason: SURG

## 2021-01-21 RX ORDER — FENTANYL CITRATE 50 UG/ML
INJECTION, SOLUTION INTRAMUSCULAR; INTRAVENOUS AS NEEDED
Status: DISCONTINUED | OUTPATIENT
Start: 2021-01-21 | End: 2021-01-21 | Stop reason: SURG

## 2021-01-21 RX ORDER — MIDAZOLAM HYDROCHLORIDE 1 MG/ML
INJECTION INTRAMUSCULAR; INTRAVENOUS AS NEEDED
Status: DISCONTINUED | OUTPATIENT
Start: 2021-01-21 | End: 2021-01-21 | Stop reason: SURG

## 2021-01-21 RX ORDER — OXYCODONE HYDROCHLORIDE AND ACETAMINOPHEN 5; 325 MG/1; MG/1
1 TABLET ORAL ONCE AS NEEDED
Status: DISCONTINUED | OUTPATIENT
Start: 2021-01-21 | End: 2021-01-21 | Stop reason: HOSPADM

## 2021-01-21 RX ORDER — SODIUM CHLORIDE 0.9 % (FLUSH) 0.9 %
10 SYRINGE (ML) INJECTION AS NEEDED
Status: DISCONTINUED | OUTPATIENT
Start: 2021-01-21 | End: 2021-01-21 | Stop reason: HOSPADM

## 2021-01-21 RX ORDER — SODIUM CHLORIDE, SODIUM LACTATE, POTASSIUM CHLORIDE, CALCIUM CHLORIDE 600; 310; 30; 20 MG/100ML; MG/100ML; MG/100ML; MG/100ML
125 INJECTION, SOLUTION INTRAVENOUS ONCE
Status: DISCONTINUED | OUTPATIENT
Start: 2021-01-21 | End: 2021-01-21 | Stop reason: HOSPADM

## 2021-01-21 RX ORDER — KETOROLAC TROMETHAMINE 30 MG/ML
INJECTION, SOLUTION INTRAMUSCULAR; INTRAVENOUS AS NEEDED
Status: DISCONTINUED | OUTPATIENT
Start: 2021-01-21 | End: 2021-01-21 | Stop reason: SURG

## 2021-01-21 RX ORDER — BUPIVACAINE HYDROCHLORIDE 5 MG/ML
INJECTION, SOLUTION PERINEURAL AS NEEDED
Status: DISCONTINUED | OUTPATIENT
Start: 2021-01-21 | End: 2021-01-21 | Stop reason: HOSPADM

## 2021-01-21 RX ORDER — SODIUM CHLORIDE 0.9 % (FLUSH) 0.9 %
10 SYRINGE (ML) INJECTION EVERY 12 HOURS SCHEDULED
Status: DISCONTINUED | OUTPATIENT
Start: 2021-01-21 | End: 2021-01-21 | Stop reason: HOSPADM

## 2021-01-21 RX ORDER — OXYCODONE HYDROCHLORIDE AND ACETAMINOPHEN 5; 325 MG/1; MG/1
1-2 TABLET ORAL EVERY 4 HOURS PRN
Qty: 16 TABLET | Refills: 0 | Status: SHIPPED | OUTPATIENT
Start: 2021-01-21 | End: 2021-03-30

## 2021-01-21 RX ORDER — PROPOFOL 10 MG/ML
VIAL (ML) INTRAVENOUS AS NEEDED
Status: DISCONTINUED | OUTPATIENT
Start: 2021-01-21 | End: 2021-01-21 | Stop reason: SURG

## 2021-01-21 RX ORDER — FENTANYL CITRATE 50 UG/ML
50 INJECTION, SOLUTION INTRAMUSCULAR; INTRAVENOUS
Status: DISCONTINUED | OUTPATIENT
Start: 2021-01-21 | End: 2021-01-21 | Stop reason: HOSPADM

## 2021-01-21 RX ORDER — ONDANSETRON 2 MG/ML
INJECTION INTRAMUSCULAR; INTRAVENOUS AS NEEDED
Status: DISCONTINUED | OUTPATIENT
Start: 2021-01-21 | End: 2021-01-21 | Stop reason: SURG

## 2021-01-21 RX ORDER — MAGNESIUM HYDROXIDE 1200 MG/15ML
LIQUID ORAL AS NEEDED
Status: DISCONTINUED | OUTPATIENT
Start: 2021-01-21 | End: 2021-01-21 | Stop reason: HOSPADM

## 2021-01-21 RX ORDER — LIDOCAINE HYDROCHLORIDE 10 MG/ML
INJECTION, SOLUTION INFILTRATION; PERINEURAL AS NEEDED
Status: DISCONTINUED | OUTPATIENT
Start: 2021-01-21 | End: 2021-01-21 | Stop reason: HOSPADM

## 2021-01-21 RX ORDER — ONDANSETRON 2 MG/ML
4 INJECTION INTRAMUSCULAR; INTRAVENOUS AS NEEDED
Status: DISCONTINUED | OUTPATIENT
Start: 2021-01-21 | End: 2021-01-21 | Stop reason: HOSPADM

## 2021-01-21 RX ORDER — MIDAZOLAM HYDROCHLORIDE 1 MG/ML
1 INJECTION INTRAMUSCULAR; INTRAVENOUS
Status: DISCONTINUED | OUTPATIENT
Start: 2021-01-21 | End: 2021-01-21 | Stop reason: HOSPADM

## 2021-01-21 RX ORDER — MEPERIDINE HYDROCHLORIDE 25 MG/ML
12.5 INJECTION INTRAMUSCULAR; INTRAVENOUS; SUBCUTANEOUS
Status: DISCONTINUED | OUTPATIENT
Start: 2021-01-21 | End: 2021-01-21 | Stop reason: HOSPADM

## 2021-01-21 RX ORDER — LIDOCAINE HYDROCHLORIDE 20 MG/ML
INJECTION, SOLUTION INFILTRATION; PERINEURAL AS NEEDED
Status: DISCONTINUED | OUTPATIENT
Start: 2021-01-21 | End: 2021-01-21 | Stop reason: SURG

## 2021-01-21 RX ADMIN — LIDOCAINE HYDROCHLORIDE 60 MG: 20 INJECTION, SOLUTION INFILTRATION; PERINEURAL at 10:34

## 2021-01-21 RX ADMIN — PROPOFOL 100 MCG/KG/MIN: 10 INJECTION, EMULSION INTRAVENOUS at 10:34

## 2021-01-21 RX ADMIN — FENTANYL CITRATE 25 MCG: 50 INJECTION INTRAMUSCULAR; INTRAVENOUS at 10:34

## 2021-01-21 RX ADMIN — PROPOFOL 80 MG: 10 INJECTION, EMULSION INTRAVENOUS at 10:34

## 2021-01-21 RX ADMIN — ONDANSETRON 4 MG: 2 INJECTION INTRAMUSCULAR; INTRAVENOUS at 11:05

## 2021-01-21 RX ADMIN — SODIUM CHLORIDE, POTASSIUM CHLORIDE, SODIUM LACTATE AND CALCIUM CHLORIDE: 600; 310; 30; 20 INJECTION, SOLUTION INTRAVENOUS at 10:29

## 2021-01-21 RX ADMIN — FAMOTIDINE 20 MG: 10 INJECTION INTRAVENOUS at 11:05

## 2021-01-21 RX ADMIN — MIDAZOLAM 2 MG: 1 INJECTION INTRAMUSCULAR; INTRAVENOUS at 10:29

## 2021-01-21 RX ADMIN — KETOROLAC TROMETHAMINE 30 MG: 30 INJECTION, SOLUTION INTRAMUSCULAR at 11:05

## 2021-01-21 NOTE — ANESTHESIA PREPROCEDURE EVALUATION
Anesthesia Evaluation     no history of anesthetic complications:  NPO Solid Status: > 8 hours  NPO Liquid Status: > 8 hours           Airway   Mallampati: II  TM distance: >3 FB  Neck ROM: full  No difficulty expected  Dental    (+) poor dentition    Pulmonary    (+) a smoker Former, shortness of breath, sleep apnea,   Cardiovascular     (+) hypertension, hyperlipidemia,       Neuro/Psych  (+) dizziness/light headedness,     GI/Hepatic/Renal/Endo    (+) obesity,   thyroid problem hypothyroidism    Musculoskeletal     Abdominal    Substance History      OB/GYN          Other                        Anesthesia Plan    ASA 3     general and MAC     intravenous induction     Anesthetic plan, all risks, benefits, and alternatives have been provided, discussed and informed consent has been obtained with: patient.

## 2021-01-21 NOTE — OP NOTE
CARPAL TUNNEL RELEASE  Procedure Note    Andres Cortez  1/21/2021    Pre-op Diagnosis:   Carpal tunnel syndrome, right [G56.01]    Post-op Diagnosis:     Post-Op Diagnosis Codes:     * Carpal tunnel syndrome, right [G56.01]    Procedure(s):  RIGHT CARPAL TUNNEL RELEASE    Surgeon(s):  Fitz Curry MD    Anesthesia: General    Operative technique: With patient in the operating theatre under general IV sedation with the right hand and arm sterilely prepped and draped in usual manner extremity was exsanguinated tourniquet inflated to 200 mmHg.  Palmar aspect right hand infiltrated with 5 cc of 0.5 Marcaine.  Longitudinal incision was then created 5 cm in length beginning at the distal wrist crease extending toward the fourth finger carried through skin sharply.  Palmar fascia was divided exposing underlying transverse carpal ligament which was then divided protecting underlying median nerve.  Tourniquet was then deflated hemostasis obtained with electrocautery wound closed in a single layer 3-0 nylon vertical mattress suture sterile dressing applied she was taken to recovery in stable condition.    Staff:   Circulator: Shirlene Santos RN; Luis Umanzor RN  Scrub Person: Aminata Melchor  Assistant: Grupo Forrest    Estimated Blood Loss: none    Specimens:   none               Implants/Grafts: none      Drains: None    Complications: none    Tourniquet time: 7   min    Fitz Curry MD     Date: 1/21/2021  Time: 11:11 EST    Cc: Katlyn Cavanaugh APRN

## 2021-01-21 NOTE — ANESTHESIA POSTPROCEDURE EVALUATION
Patient: Andres Cortez    Procedure Summary     Date: 01/21/21 Room / Location: [facility] OR 03 /  [facility] OR    Anesthesia Start: 1029 Anesthesia Stop: 1109    Procedure: RIGHT CARPAL TUNNEL RELEASE (Right Wrist) Diagnosis:       Carpal tunnel syndrome, right      (Carpal tunnel syndrome, right [G56.01])    Surgeon: Fitz Curry MD Provider: Zeus Wilson MD    Anesthesia Type: general, MAC ASA Status: 3          Anesthesia Type: general, MAC    Vitals  Vitals Value Taken Time   /81 01/21/21 1132   Temp 97.4 °F (36.3 °C) 01/21/21 1112   Pulse 77 01/21/21 1132   Resp 11 01/21/21 1132   SpO2 100 % 01/21/21 1132           Post Anesthesia Care and Evaluation    Patient location during evaluation: PHASE II  Patient participation: complete - patient participated  Level of consciousness: awake and alert  Pain score: 1  Pain management: adequate  Airway patency: patent  Anesthetic complications: No anesthetic complications  PONV Status: controlled  Cardiovascular status: acceptable  Respiratory status: acceptable  Hydration status: acceptable

## 2021-02-03 ENCOUNTER — OFFICE VISIT (OUTPATIENT)
Dept: ORTHOPEDIC SURGERY | Facility: CLINIC | Age: 51
End: 2021-02-03

## 2021-02-03 VITALS — HEIGHT: 67 IN | TEMPERATURE: 97.1 F | BODY MASS INDEX: 43 KG/M2 | WEIGHT: 274 LBS

## 2021-02-03 DIAGNOSIS — Z98.890 S/P CARPAL TUNNEL RELEASE: Primary | ICD-10-CM

## 2021-02-03 DIAGNOSIS — Z09 POSTOP CHECK: ICD-10-CM

## 2021-02-03 PROCEDURE — 99024 POSTOP FOLLOW-UP VISIT: CPT | Performed by: ORTHOPAEDIC SURGERY

## 2021-02-03 NOTE — PROGRESS NOTES
Follow-up Visit         Patient: Andres Cortez  YOB: 1970  Date of Encounter: 02/03/2021      Chief  Complaint:   Chief Complaint   Patient presents with   • Right Hand - Post-op, Pain     01/21/21 (13 days post-op)     Fitz Curry MD    Right Carpal Tunnel Release - Right               HPI:  Andres Cortez, 51 y.o. female presents in follow-up carpal tunnel release right hand January 21 of 2021.  She is now 13 days postop.  She reports the burning pain in her hand is resolved.          Medical History:  Patient Active Problem List   Diagnosis   • Chest pain   • Shortness of breath   • Essential hypertension   • Abnormal stress test   • Lower extremity edema   • Disease of thyroid gland   • Former tobacco use   • Vertigo   • Palpitations   • Carpal tunnel syndrome, bilateral   • Carpal tunnel syndrome, right   • S/P carpal tunnel release     Past Medical History:   Diagnosis Date   • Anxiety    • Arthritis    • Depression    • Disease of thyroid gland    • Elevated cholesterol    • Hypertension    • Hypothyroid    • Neuropathy    • Panic attacks    • Sleep apnea     uses c-pap   • Vertigo, benign paroxysmal        Social History:  Social History     Socioeconomic History   • Marital status:      Spouse name: Not on file   • Number of children: Not on file   • Years of education: Not on file   • Highest education level: Not on file   Tobacco Use   • Smoking status: Former Smoker     Packs/day: 1.00     Years: 8.00     Pack years: 8.00     Types: Cigarettes   • Smokeless tobacco: Never Used   Substance and Sexual Activity   • Alcohol use: No   • Drug use: No   • Sexual activity: Defer     Birth control/protection: None       Surgical History:  Past Surgical History:   Procedure Laterality Date   • ABDOMINAL SURGERY     • BACK SURGERY      with instrumentation   • CARPAL TUNNEL RELEASE Right 1/21/2021    Procedure: RIGHT CARPAL TUNNEL RELEASE;  Surgeon: Fitz Curry,  MD;  Location: [facility];  Service: Orthopedics;  Laterality: Right;   • CHOLECYSTECTOMY     • HAND SURGERY     • NECK SURGERY      with instrumentation         Examination:   Examination right hand reveals palmar incision intact.      Assessment & Plan:   51 y.o. female presents doing well following carpal tunnel release right hand 13 days ago January 21, 2021.  She will follow-up in 6 weeks.  She will resume regular activities.         Diagnosis Plan   1. S/P carpal tunnel release     2. Postop check         Cc:  Katlyn Cavanaugh, PATRICIA              This document has been electronically signed by Fitz Curry MD   February 7, 2021 14:14 EST

## 2021-02-07 PROBLEM — Z98.890 S/P CARPAL TUNNEL RELEASE: Status: ACTIVE | Noted: 2021-02-07

## 2021-03-17 ENCOUNTER — OFFICE VISIT (OUTPATIENT)
Dept: ORTHOPEDIC SURGERY | Facility: CLINIC | Age: 51
End: 2021-03-17

## 2021-03-17 VITALS — BODY MASS INDEX: 42.91 KG/M2 | WEIGHT: 273.37 LBS | HEIGHT: 67 IN | TEMPERATURE: 98.4 F

## 2021-03-17 DIAGNOSIS — G56.02 CARPAL TUNNEL SYNDROME, LEFT: Primary | ICD-10-CM

## 2021-03-17 DIAGNOSIS — Z01.818 PREOPERATIVE TESTING: ICD-10-CM

## 2021-03-17 PROCEDURE — 99214 OFFICE O/P EST MOD 30 MIN: CPT | Performed by: ORTHOPAEDIC SURGERY

## 2021-03-17 RX ORDER — PREGABALIN 300 MG/1
200 CAPSULE ORAL 2 TIMES DAILY
COMMUNITY
Start: 2021-03-15

## 2021-03-17 RX ORDER — DULOXETIN HYDROCHLORIDE 60 MG/1
90 CAPSULE, DELAYED RELEASE ORAL DAILY
COMMUNITY
Start: 2021-03-07

## 2021-03-17 RX ORDER — CLONAZEPAM 0.5 MG/1
0.5 TABLET ORAL 3 TIMES DAILY PRN
COMMUNITY
Start: 2021-03-15

## 2021-03-17 NOTE — PROGRESS NOTES
History and Physical      Patient: Andres Cortez  YOB: 1970  Date of Encounter: 03/17/2021      Chief Complaint:   Chief Complaint   Patient presents with   • Right Hand - Post-op, Follow-up, Numbness     Procedure:01/21/2021  RIGHT CARPAL TUNNEL RELEASE     Surgeon:  Fitz Curry MD           HPI:   Andres Cortez, 51 y.o. female, presents in follow-up carpal tunnel release right hand January 21, 2021 she is significantly improved and reports the burning in her hand is much better but she still experiences some numbness in her fourth and fifth fingers.  Her major complaint is pain in her left hand.  She has previously been diagnosed with carpal tunnel syndrome bilaterally and has not undergone carpal tunnel release on the left.  She wishes to schedule.        Active Problem List:  Patient Active Problem List   Diagnosis   • Chest pain   • Shortness of breath   • Essential hypertension   • Abnormal stress test   • Lower extremity edema   • Disease of thyroid gland   • Former tobacco use   • Vertigo   • Palpitations   • Carpal tunnel syndrome, bilateral   • Carpal tunnel syndrome, right   • S/P carpal tunnel release   • Carpal tunnel syndrome, left           Past Medical History:  Past Medical History:   Diagnosis Date   • Anxiety    • Arthritis    • Depression    • Disease of thyroid gland    • Elevated cholesterol    • Hypertension    • Hypothyroid    • Neuropathy    • Panic attacks    • Sleep apnea     uses c-pap   • Vertigo, benign paroxysmal            Past Surgical History:  Past Surgical History:   Procedure Laterality Date   • ABDOMINAL SURGERY     • BACK SURGERY      with instrumentation   • CARPAL TUNNEL RELEASE Right 1/21/2021    Procedure: RIGHT CARPAL TUNNEL RELEASE;  Surgeon: Fitz Curry MD;  Location: [facility];  Service: Orthopedics;  Laterality: Right;   • CHOLECYSTECTOMY     • HAND SURGERY     • NECK SURGERY      with instrumentation           Family  History:  Family History   Problem Relation Age of Onset   • Stroke Mother    • Heart attack Mother    • Heart failure Father    • Diabetes Father    • Heart failure Brother            Social History:  Social History     Socioeconomic History   • Marital status:      Spouse name: Not on file   • Number of children: Not on file   • Years of education: Not on file   • Highest education level: Not on file   Tobacco Use   • Smoking status: Former Smoker     Packs/day: 1.00     Years: 8.00     Pack years: 8.00     Types: Cigarettes   • Smokeless tobacco: Never Used   Vaping Use   • Vaping Use: Never used   Substance and Sexual Activity   • Alcohol use: No   • Drug use: No   • Sexual activity: Defer     Birth control/protection: None     Body mass index is 42.81 kg/m².        Medications:  Current Outpatient Medications   Medication Sig Dispense Refill   • aspirin 81 MG tablet Take 1 tablet by mouth Daily. 30 tablet 6   • atorvastatin (LIPITOR) 10 MG tablet Take 10 mg by mouth Daily.     • clonazePAM (KlonoPIN) 0.5 MG tablet      • CloNIDine (CATAPRES) 0.1 MG tablet Take 0.05-0.1 mg by mouth 2 (Two) Times a Day.     • DULoxetine (CYMBALTA) 60 MG capsule Take 60 mg by mouth Daily.     • furosemide (LASIX) 20 MG tablet Take 1 tablet by mouth 2 (Two) Times a Day. 60 tablet 6   • gemfibrozil (LOPID) 600 MG tablet Take 600 mg by mouth Daily.     • hydrochlorothiazide (HYDRODIURIL) 25 MG tablet Take 25 mg by mouth Daily.     • levothyroxine (SYNTHROID, LEVOTHROID) 100 MCG tablet Take 100 mcg by mouth Daily.     • meclizine (ANTIVERT) 25 MG tablet Take 1 tablet by mouth 3 (Three) Times a Day As Needed for dizziness. 30 tablet 0   • meloxicam (MOBIC) 15 MG tablet Take 15 mg by mouth every night at bedtime.     • metoprolol succinate XL (TOPROL-XL) 50 MG 24 hr tablet Take 50 mg by mouth Every Night.     • olmesartan (BENICAR) 40 MG tablet Take 40 mg by mouth Daily.     • Omega-3 Fatty Acids (fish oil) 1000 MG capsule  capsule Take 1,000 mg by mouth 2 (Two) Times a Day With Meals.     • ondansetron (ZOFRAN) 4 MG tablet Take 1 tablet by mouth Every 8 (Eight) Hours As Needed for Nausea or Vomiting. 30 tablet 0   • oxyCODONE-acetaminophen (PERCOCET) 5-325 MG per tablet Take 1-2 tablets by mouth Every 4 (Four) Hours As Needed (Pain). 16 tablet 0   • potassium chloride (MICRO-K) 10 MEQ CR capsule 10 mEq Daily.     • pregabalin (LYRICA) 300 MG capsule      • promethazine (PHENERGAN) 25 MG tablet TAKE 1 TABLET BY MOUTH EVERY 4 TO 6 HOURS AS NEEDED FORNAUSEA AND VOMITING     • tiZANidine (ZANAFLEX) 4 MG tablet Take 40 mg by mouth 3 (Three) Times a Day As Needed.     • traZODone (DESYREL) 100 MG tablet Take 100 mg by mouth At Night As Needed.     • vitamin D3 125 MCG (5000 UT) capsule capsule Take 1 capsule by mouth 1 (One) Time Per Week.     • DULoxetine (CYMBALTA) 30 MG capsule Take 30 mg by mouth Daily. 60mg in am and 30mg in pm     • LORazepam (ATIVAN) 0.5 MG tablet Take 0.5 mg by mouth 2 (Two) Times a Day As Needed. for anxiety     • pregabalin (LYRICA) 100 MG capsule Take 225 mg by mouth 2 (Two) Times a Day.       No current facility-administered medications for this visit.           Allergies:  No Known Allergies        Review of Systems:   Review of Systems   HENT: Negative.    Eyes: Negative.    Respiratory: Positive for apnea.    Cardiovascular: Negative.    Gastrointestinal: Negative.    Endocrine: Negative.    Genitourinary: Negative.    Musculoskeletal: Positive for arthralgias, back pain, gait problem, joint swelling and neck pain.   Skin: Negative.    Allergic/Immunologic: Negative.    Neurological: Positive for weakness and numbness.        Vertigo   Hematological: Negative.    Psychiatric/Behavioral: Positive for dysphoric mood and sleep disturbance. The patient is nervous/anxious.            Physical Exam:   Physical Exam  Vitals and nursing note reviewed.   Constitutional:       General: She is not in acute distress.      "Appearance: She is not diaphoretic.   HENT:      Head: Normocephalic and atraumatic.      Right Ear: External ear normal.      Left Ear: External ear normal.   Eyes:      General:         Right eye: No discharge.         Left eye: No discharge.      Conjunctiva/sclera: Conjunctivae normal.   Cardiovascular:      Rate and Rhythm: Normal rate and regular rhythm.      Heart sounds: Normal heart sounds. No murmur heard.     Pulmonary:      Effort: Pulmonary effort is normal. No respiratory distress.      Breath sounds: Normal breath sounds. No wheezing.   Abdominal:      General: There is no distension.      Palpations: Abdomen is soft.      Tenderness: There is no guarding.   Musculoskeletal:      Cervical back: Normal range of motion and neck supple.   Skin:     General: Skin is warm and dry.      Capillary Refill: Capillary refill takes less than 2 seconds.   Neurological:      Mental Status: She is alert and oriented to person, place, and time.   Psychiatric:         Behavior: Behavior normal.         Thought Content: Thought content normal.         Judgment: Judgment normal.       GENERAL: 51 y.o. female, alert and oriented X 3 in no acute distress.   Visit Vitals  Temp 98.4 °F (36.9 °C)   Ht 170.2 cm (67.01\")   Wt 124 kg (273 lb 5.9 oz)   BMI 42.81 kg/m²         Musculoskeletal:   Examination right hand reveals midline palmar incision intact.  She has slight diminished sensation to the fourth and fifth fingers.  Left hand evaluation shows decreased sensation throughout all fingers in her left hand she has moderately positive Phalen sign negative Tinel's sign.    EMG/NCS:        Assessment & Plan:   51 y.o. female presents as post carpal tunnel release on the right doing well.  I anticipate her hand symptoms will improve with time.  Regarding her left hand she wishes to proceed with carpal tunnel release left hand with EMG nerve conduction studies documenting moderate carpal tunnel syndrome left hand surgery is " scheduled [facility] April 1, 2021.      ICD-10-CM ICD-9-CM   1. Carpal tunnel syndrome, left  G56.02 354.0           Cc:   Katlyn Cavanaugh, PATRICIA              This document has been electronically signed by Fitz Curry MD   March 17, 2021 10:00 EDT

## 2021-03-21 ENCOUNTER — HOSPITAL ENCOUNTER (EMERGENCY)
Facility: HOSPITAL | Age: 51
Discharge: HOME | End: 2021-03-21
Attending: STUDENT IN AN ORGANIZED HEALTH CARE EDUCATION/TRAINING PROGRAM | Admitting: EMERGENCY MEDICINE

## 2021-03-21 ENCOUNTER — APPOINTMENT (OUTPATIENT)
Dept: GENERAL RADIOLOGY | Facility: HOSPITAL | Age: 51
End: 2021-03-21

## 2021-03-21 VITALS
RESPIRATION RATE: 20 BRPM | WEIGHT: 273 LBS | OXYGEN SATURATION: 99 % | TEMPERATURE: 98.9 F | HEART RATE: 81 BPM | DIASTOLIC BLOOD PRESSURE: 78 MMHG | SYSTOLIC BLOOD PRESSURE: 131 MMHG | BODY MASS INDEX: 43.87 KG/M2 | HEIGHT: 66 IN

## 2021-03-21 DIAGNOSIS — N39.0 ACUTE UTI: ICD-10-CM

## 2021-03-21 DIAGNOSIS — R60.9 SWELLING OF BODY REGION: Primary | ICD-10-CM

## 2021-03-21 LAB
ALBUMIN SERPL-MCNC: 3.8 G/DL (ref 3.5–5.2)
ALBUMIN/GLOB SERPL: 1.2 G/DL
ALP SERPL-CCNC: 91 U/L (ref 39–117)
ALT SERPL W P-5'-P-CCNC: 26 U/L (ref 1–33)
ANION GAP SERPL CALCULATED.3IONS-SCNC: 11.8 MMOL/L (ref 5–15)
APTT PPP: 26.3 SECONDS (ref 25.6–35.3)
AST SERPL-CCNC: 29 U/L (ref 1–32)
BACTERIA UR QL AUTO: ABNORMAL /HPF
BASOPHILS # BLD AUTO: 0.08 10*3/MM3 (ref 0–0.2)
BASOPHILS NFR BLD AUTO: 0.7 % (ref 0–1.5)
BILIRUB SERPL-MCNC: 0.3 MG/DL (ref 0–1.2)
BILIRUB UR QL STRIP: NEGATIVE
BUN SERPL-MCNC: 9 MG/DL (ref 6–20)
BUN/CREAT SERPL: 18 (ref 7–25)
CALCIUM SPEC-SCNC: 9.3 MG/DL (ref 8.6–10.5)
CHLORIDE SERPL-SCNC: 105 MMOL/L (ref 98–107)
CK SERPL-CCNC: 58 U/L (ref 20–180)
CLARITY UR: CLEAR
CO2 SERPL-SCNC: 25.2 MMOL/L (ref 22–29)
COLOR UR: YELLOW
CREAT SERPL-MCNC: 0.5 MG/DL (ref 0.57–1)
DEPRECATED RDW RBC AUTO: 48.7 FL (ref 37–54)
EOSINOPHIL # BLD AUTO: 0.36 10*3/MM3 (ref 0–0.4)
EOSINOPHIL NFR BLD AUTO: 3.1 % (ref 0.3–6.2)
ERYTHROCYTE [DISTWIDTH] IN BLOOD BY AUTOMATED COUNT: 15.1 % (ref 12.3–15.4)
FLUAV RNA RESP QL NAA+PROBE: NOT DETECTED
FLUBV RNA RESP QL NAA+PROBE: NOT DETECTED
GFR SERPL CREATININE-BSD FRML MDRD: 130 ML/MIN/1.73
GLOBULIN UR ELPH-MCNC: 3.1 GM/DL
GLUCOSE SERPL-MCNC: 91 MG/DL (ref 65–99)
GLUCOSE UR STRIP-MCNC: NEGATIVE MG/DL
HCG SERPL QL: NEGATIVE
HCT VFR BLD AUTO: 40.9 % (ref 34–46.6)
HGB BLD-MCNC: 13.3 G/DL (ref 12–15.9)
HGB UR QL STRIP.AUTO: NEGATIVE
HYALINE CASTS UR QL AUTO: ABNORMAL /LPF
IMM GRANULOCYTES # BLD AUTO: 0.11 10*3/MM3 (ref 0–0.05)
IMM GRANULOCYTES NFR BLD AUTO: 0.9 % (ref 0–0.5)
INR PPP: 0.97 (ref 0.9–1.1)
KETONES UR QL STRIP: NEGATIVE
LEUKOCYTE ESTERASE UR QL STRIP.AUTO: ABNORMAL
LYMPHOCYTES # BLD AUTO: 2.55 10*3/MM3 (ref 0.7–3.1)
LYMPHOCYTES NFR BLD AUTO: 21.7 % (ref 19.6–45.3)
MAGNESIUM SERPL-MCNC: 1.9 MG/DL (ref 1.6–2.6)
MCH RBC QN AUTO: 28.5 PG (ref 26.6–33)
MCHC RBC AUTO-ENTMCNC: 32.5 G/DL (ref 31.5–35.7)
MCV RBC AUTO: 87.8 FL (ref 79–97)
MONOCYTES # BLD AUTO: 0.79 10*3/MM3 (ref 0.1–0.9)
MONOCYTES NFR BLD AUTO: 6.7 % (ref 5–12)
NEUTROPHILS NFR BLD AUTO: 66.9 % (ref 42.7–76)
NEUTROPHILS NFR BLD AUTO: 7.84 10*3/MM3 (ref 1.7–7)
NITRITE UR QL STRIP: POSITIVE
NRBC BLD AUTO-RTO: 0 /100 WBC (ref 0–0.2)
NT-PROBNP SERPL-MCNC: 60.6 PG/ML (ref 0–900)
PH UR STRIP.AUTO: 7 [PH] (ref 5–8)
PHOSPHATE SERPL-MCNC: 3 MG/DL (ref 2.5–4.5)
PLATELET # BLD AUTO: 379 10*3/MM3 (ref 140–450)
PMV BLD AUTO: 9.6 FL (ref 6–12)
POTASSIUM SERPL-SCNC: 3.7 MMOL/L (ref 3.5–5.2)
PROT SERPL-MCNC: 6.9 G/DL (ref 6–8.5)
PROT UR QL STRIP: NEGATIVE
PROTHROMBIN TIME: 12.7 SECONDS (ref 11.9–14.1)
RBC # BLD AUTO: 4.66 10*6/MM3 (ref 3.77–5.28)
RBC # UR: ABNORMAL /HPF
REF LAB TEST METHOD: ABNORMAL
SARS-COV-2 RNA RESP QL NAA+PROBE: NOT DETECTED
SODIUM SERPL-SCNC: 142 MMOL/L (ref 136–145)
SP GR UR STRIP: 1.01 (ref 1–1.03)
SQUAMOUS #/AREA URNS HPF: ABNORMAL /HPF
T4 FREE SERPL-MCNC: 1.01 NG/DL (ref 0.93–1.7)
TROPONIN T SERPL-MCNC: <0.01 NG/ML (ref 0–0.03)
TSH SERPL DL<=0.05 MIU/L-ACNC: 0.5 UIU/ML (ref 0.27–4.2)
UROBILINOGEN UR QL STRIP: ABNORMAL
WBC # BLD AUTO: 11.73 10*3/MM3 (ref 3.4–10.8)
WBC UR QL AUTO: ABNORMAL /HPF

## 2021-03-21 PROCEDURE — 85025 COMPLETE CBC W/AUTO DIFF WBC: CPT | Performed by: STUDENT IN AN ORGANIZED HEALTH CARE EDUCATION/TRAINING PROGRAM

## 2021-03-21 PROCEDURE — 25010000002 FUROSEMIDE PER 20 MG: Performed by: STUDENT IN AN ORGANIZED HEALTH CARE EDUCATION/TRAINING PROGRAM

## 2021-03-21 PROCEDURE — 85610 PROTHROMBIN TIME: CPT | Performed by: STUDENT IN AN ORGANIZED HEALTH CARE EDUCATION/TRAINING PROGRAM

## 2021-03-21 PROCEDURE — 85730 THROMBOPLASTIN TIME PARTIAL: CPT | Performed by: STUDENT IN AN ORGANIZED HEALTH CARE EDUCATION/TRAINING PROGRAM

## 2021-03-21 PROCEDURE — 81001 URINALYSIS AUTO W/SCOPE: CPT | Performed by: STUDENT IN AN ORGANIZED HEALTH CARE EDUCATION/TRAINING PROGRAM

## 2021-03-21 PROCEDURE — 71045 X-RAY EXAM CHEST 1 VIEW: CPT

## 2021-03-21 PROCEDURE — 96374 THER/PROPH/DIAG INJ IV PUSH: CPT

## 2021-03-21 PROCEDURE — 82550 ASSAY OF CK (CPK): CPT | Performed by: STUDENT IN AN ORGANIZED HEALTH CARE EDUCATION/TRAINING PROGRAM

## 2021-03-21 PROCEDURE — 93005 ELECTROCARDIOGRAM TRACING: CPT | Performed by: STUDENT IN AN ORGANIZED HEALTH CARE EDUCATION/TRAINING PROGRAM

## 2021-03-21 PROCEDURE — 93010 ELECTROCARDIOGRAM REPORT: CPT | Performed by: INTERNAL MEDICINE

## 2021-03-21 PROCEDURE — 84484 ASSAY OF TROPONIN QUANT: CPT | Performed by: STUDENT IN AN ORGANIZED HEALTH CARE EDUCATION/TRAINING PROGRAM

## 2021-03-21 PROCEDURE — 87636 SARSCOV2 & INF A&B AMP PRB: CPT | Performed by: STUDENT IN AN ORGANIZED HEALTH CARE EDUCATION/TRAINING PROGRAM

## 2021-03-21 PROCEDURE — 99284 EMERGENCY DEPT VISIT MOD MDM: CPT

## 2021-03-21 PROCEDURE — 84439 ASSAY OF FREE THYROXINE: CPT | Performed by: STUDENT IN AN ORGANIZED HEALTH CARE EDUCATION/TRAINING PROGRAM

## 2021-03-21 PROCEDURE — 83880 ASSAY OF NATRIURETIC PEPTIDE: CPT | Performed by: STUDENT IN AN ORGANIZED HEALTH CARE EDUCATION/TRAINING PROGRAM

## 2021-03-21 PROCEDURE — 84100 ASSAY OF PHOSPHORUS: CPT | Performed by: STUDENT IN AN ORGANIZED HEALTH CARE EDUCATION/TRAINING PROGRAM

## 2021-03-21 PROCEDURE — 80053 COMPREHEN METABOLIC PANEL: CPT | Performed by: STUDENT IN AN ORGANIZED HEALTH CARE EDUCATION/TRAINING PROGRAM

## 2021-03-21 PROCEDURE — 84703 CHORIONIC GONADOTROPIN ASSAY: CPT | Performed by: STUDENT IN AN ORGANIZED HEALTH CARE EDUCATION/TRAINING PROGRAM

## 2021-03-21 PROCEDURE — 84443 ASSAY THYROID STIM HORMONE: CPT | Performed by: STUDENT IN AN ORGANIZED HEALTH CARE EDUCATION/TRAINING PROGRAM

## 2021-03-21 PROCEDURE — 83735 ASSAY OF MAGNESIUM: CPT | Performed by: STUDENT IN AN ORGANIZED HEALTH CARE EDUCATION/TRAINING PROGRAM

## 2021-03-21 PROCEDURE — 71045 X-RAY EXAM CHEST 1 VIEW: CPT | Performed by: RADIOLOGY

## 2021-03-21 RX ORDER — IBUPROFEN 600 MG/1
600 TABLET ORAL ONCE
Status: COMPLETED | OUTPATIENT
Start: 2021-03-21 | End: 2021-03-21

## 2021-03-21 RX ORDER — FUROSEMIDE 10 MG/ML
40 INJECTION INTRAMUSCULAR; INTRAVENOUS ONCE
Status: COMPLETED | OUTPATIENT
Start: 2021-03-21 | End: 2021-03-21

## 2021-03-21 RX ORDER — CEFDINIR 300 MG/1
300 CAPSULE ORAL 2 TIMES DAILY
Qty: 14 CAPSULE | Refills: 0 | Status: SHIPPED | OUTPATIENT
Start: 2021-03-21 | End: 2021-10-20

## 2021-03-21 RX ADMIN — IBUPROFEN 600 MG: 600 TABLET ORAL at 19:05

## 2021-03-21 RX ADMIN — FUROSEMIDE 40 MG: 10 INJECTION, SOLUTION INTRAMUSCULAR; INTRAVENOUS at 17:03

## 2021-03-21 NOTE — ED PROVIDER NOTES
Subjective   Patient is a 51-year-old female coming in for evaluation of generalized body edema.  Patient states she has had similar episodes in the past for her entire body feels swollen.  Usually Lasix helped but she took a p.o. 40 mg Lasix today which did not relieve her symptoms so came to the ER.  Patient also endorses myalgias general lethargy.  Feels like her entire body swollen.  Denies any fevers cough shortness of breath or chest pain.  Reports medication compliance.  Being worked up for possible absence seizures taking Trileptal prescribed by neurologist. Denies any recent seizures.       History provided by:  Patient  Leg Swelling  Location:  Whole body, worse in legs  Severity:  Moderate  Onset quality:  Gradual  Timing:  Constant  Progression:  Worsening  Chronicity:  New  Relieved by:  Lasix  Associated symptoms: myalgias    Associated symptoms: no abdominal pain, no chest pain, no cough, no diarrhea, no fatigue, no fever, no headaches, no nausea, no rash, no shortness of breath, no sore throat and no vomiting        Review of Systems   Constitutional: Negative for fatigue and fever.   HENT: Negative for sore throat and trouble swallowing.    Eyes: Negative for pain and redness.   Respiratory: Negative for cough and shortness of breath.    Cardiovascular: Positive for leg swelling. Negative for chest pain and palpitations.   Gastrointestinal: Negative for abdominal pain, diarrhea, nausea and vomiting.   Genitourinary: Negative for dysuria, flank pain and hematuria.   Musculoskeletal: Positive for joint swelling and myalgias. Negative for back pain.   Skin: Negative for rash and wound.   Neurological: Negative for seizures and headaches.   Psychiatric/Behavioral: Negative for hallucinations and suicidal ideas.       Past Medical History:   Diagnosis Date   • Anxiety    • Arthritis    • Depression    • Disease of thyroid gland    • Elevated cholesterol    • Hypertension    • Hypothyroid    • Neuropathy     • Panic attacks    • Sleep apnea     uses c-pap   • Vertigo, benign paroxysmal        No Known Allergies    Past Surgical History:   Procedure Laterality Date   • ABDOMINAL SURGERY     • BACK SURGERY      with instrumentation   • CARPAL TUNNEL RELEASE Right 1/21/2021    Procedure: RIGHT CARPAL TUNNEL RELEASE;  Surgeon: Fitz Curry MD;  Location: [facility];  Service: Orthopedics;  Laterality: Right;   • CHOLECYSTECTOMY     • HAND SURGERY     • NECK SURGERY      with instrumentation       Family History   Problem Relation Age of Onset   • Stroke Mother    • Heart attack Mother    • Heart failure Father    • Diabetes Father    • Heart failure Brother        Social History     Socioeconomic History   • Marital status:      Spouse name: Not on file   • Number of children: Not on file   • Years of education: Not on file   • Highest education level: Not on file   Tobacco Use   • Smoking status: Former Smoker     Packs/day: 1.00     Years: 8.00     Pack years: 8.00     Types: Cigarettes   • Smokeless tobacco: Never Used   Vaping Use   • Vaping Use: Never used   Substance and Sexual Activity   • Alcohol use: No   • Drug use: No   • Sexual activity: Defer     Birth control/protection: None           Objective   Physical Exam  Vitals and nursing note reviewed.   Constitutional:       Appearance: Normal appearance. She is obese. She is not ill-appearing or toxic-appearing.   HENT:      Head: Normocephalic and atraumatic.      Nose: Nose normal. No congestion.      Mouth/Throat:      Mouth: Mucous membranes are moist.      Pharynx: No oropharyngeal exudate.   Eyes:      Extraocular Movements: Extraocular movements intact.      Conjunctiva/sclera: Conjunctivae normal.      Pupils: Pupils are equal, round, and reactive to light.   Cardiovascular:      Rate and Rhythm: Normal rate and regular rhythm.   Pulmonary:      Effort: Pulmonary effort is normal. No respiratory distress.      Breath sounds: No wheezing.    Abdominal:      General: Abdomen is flat. There is no distension.      Tenderness: There is no abdominal tenderness.   Musculoskeletal:         General: Swelling present. No deformity.      Cervical back: Normal range of motion. No tenderness.      Comments: 1+ edema all extremities, 2+ BLE   Skin:     Capillary Refill: Capillary refill takes less than 2 seconds.      Findings: No rash.   Neurological:      General: No focal deficit present.      Mental Status: She is alert and oriented to person, place, and time.      Cranial Nerves: No cranial nerve deficit.      Sensory: No sensory deficit.      Motor: No weakness.      Comments: Moving all extremities well, denies sensory changes         Procedures           ED Course  ED Course as of Mar 21 1813   Sun Mar 21, 2021   1543 EKG shows sinus rhythm rate 79 normal axis normal intervals  no clear ST or T wave changes noted    [JA]   1622 Cxr clear.     [JA]   1755 Handed off to Dr. Sethi.  Pending urinalysis Covid swab reassessment after Lasix.    [JA]      ED Course User Index  [JA] Jl Munoz MD                                           MDM  DDX: fluid overload, chf, edema, anemia, kidney disease, thyroid disease    Plan: 51-year-old female coming in for generalized body edema.  Patient does appear edematous but nontoxic well-appearing no respiratory distress.  Stable vital signs.  Will get broad labs including BNP creatinine electrolytes and TSH.  Will give patient IV dose of Lasix and closely monitor.    Meds given:   Medications   furosemide (LASIX) injection 40 mg (40 mg Intravenous Given 3/21/21 1703)        Labs:   Labs Reviewed   COMPREHENSIVE METABOLIC PANEL - Abnormal; Notable for the following components:       Result Value    Creatinine 0.50 (*)     All other components within normal limits    Narrative:     GFR Normal >60  Chronic Kidney Disease <60  Kidney Failure <15     URINALYSIS W/ MICROSCOPIC IF INDICATED (NO CULTURE) - Abnormal;  Notable for the following components:    Leuk Esterase, UA Trace (*)     Nitrite, UA Positive (*)     All other components within normal limits   CBC WITH AUTO DIFFERENTIAL - Abnormal; Notable for the following components:    WBC 11.73 (*)     Immature Grans % 0.9 (*)     Neutrophils, Absolute 7.84 (*)     Immature Grans, Absolute 0.11 (*)     All other components within normal limits   URINALYSIS, MICROSCOPIC ONLY - Abnormal; Notable for the following components:    WBC, UA 3-5 (*)     Bacteria, UA 4+ (*)     All other components within normal limits   PROTIME-INR - Normal    Narrative:     Suggested INR therapeutic range for stable oral anticoagulant therapy:    Low Intensity therapy:   1.5-2.0  Moderate Intensity therapy:   2.0-3.0  High Intensity therapy:   2.5-4.0   APTT - Normal    Narrative:     PTT Heparin Therapeutic Range:  59 - 95 seconds     HCG, SERUM, QUALITATIVE - Normal   BNP (IN-HOUSE) - Normal    Narrative:     Among patients with dyspnea, NT-proBNP is highly sensitive for the detection of acute congestive heart failure. In addition NT-proBNP of <300 pg/ml effectively rules out acute congestive heart failure with 99% negative predictive value.    Results may be falsely decreased if patient taking Biotin.     TROPONIN (IN-HOUSE) - Normal    Narrative:     Troponin T Reference Range:  <= 0.03 ng/mL-   Negative for AMI  >0.03 ng/mL-     Abnormal for myocardial necrosis.  Clinicians would have to utilize clinical acumen, EKG, Troponin and serial changes to determine if it is an Acute Myocardial Infarction or myocardial injury due to an underlying chronic condition.       Results may be falsely decreased if patient taking Biotin.     CK - Normal   MAGNESIUM - Normal   PHOSPHORUS - Normal   TSH - Normal   T4, FREE - Normal    Narrative:     Results may be falsely increased if patient taking Biotin.     COVID-19 AND FLU A/B, NP SWAB IN TRANSPORT MEDIA 8-12 HR TAT   CBC AND DIFFERENTIAL    Narrative:      The following orders were created for panel order CBC & Differential.  Procedure                               Abnormality         Status                     ---------                               -----------         ------                     CBC Auto Differential[[id number]]        Abnormal            Final result                 Please view results for these tests on the individual orders.        Rads:   XR Chest 1 View   Final Result   No radiographic evidence of acute cardiac or pulmonary   disease.           This report was finalized on 3/21/2021 4:01 PM by Dr. Rick Georges MD.                Vitals:    03/21/21 1717 03/21/21 1732 03/21/21 1747 03/21/21 1803   BP: 129/83 125/79 125/80 113/87   BP Location:       Patient Position:       Pulse:       Resp:       Temp:       TempSrc:       SpO2: 99% 99% 100% 97%   Weight:       Height:            Final diagnoses:   Swelling of body region   Acute UTI       ED Disposition  ED Disposition     ED Disposition Condition Comment    Discharge Stable           Katlyn Cavanaugh, APRN  [address]  [facility] [address] [phone]    Schedule an appointment as soon as possible for a visit   If symptoms worsen         Medication List      New Prescriptions    cefdinir 300 MG capsule  Commonly known as: OMNICEF  Take 1 capsule by mouth 2 (Two) Times a Day.           Where to Get Your Medications      These medications were sent to [id number] - [place], [place] - [address] [address] AT [address] - [phone]  - [phone]   [address] [address]    Phone: [phone]   · cefdinir 300 MG capsule          Lalo Sethi MD  03/21/21 8377

## 2021-03-23 LAB
QT INTERVAL: 406 MS
QTC INTERVAL: 465 MS

## 2021-03-28 ENCOUNTER — ANESTHESIA EVENT (OUTPATIENT)
Dept: PERIOP | Facility: HOSPITAL | Age: 51
End: 2021-03-28

## 2021-03-30 ENCOUNTER — APPOINTMENT (OUTPATIENT)
Dept: PREADMISSION TESTING | Facility: HOSPITAL | Age: 51
End: 2021-03-30

## 2021-03-30 ENCOUNTER — LAB (OUTPATIENT)
Dept: LAB | Facility: HOSPITAL | Age: 51
End: 2021-03-30

## 2021-03-30 DIAGNOSIS — G56.02 CARPAL TUNNEL SYNDROME, LEFT: ICD-10-CM

## 2021-03-30 DIAGNOSIS — Z01.818 PRE-OPERATIVE CLEARANCE: Primary | ICD-10-CM

## 2021-03-30 PROCEDURE — U0004 COV-19 TEST NON-CDC HGH THRU: HCPCS

## 2021-03-30 PROCEDURE — C9803 HOPD COVID-19 SPEC COLLECT: HCPCS

## 2021-03-30 RX ORDER — OXCARBAZEPINE 300 MG/1
600 TABLET, FILM COATED ORAL 3 TIMES DAILY PRN
COMMUNITY

## 2021-03-31 LAB — SARS-COV-2 RNA NOSE QL NAA+PROBE: NOT DETECTED

## 2021-04-01 ENCOUNTER — HOSPITAL ENCOUNTER (OUTPATIENT)
Facility: HOSPITAL | Age: 51
Setting detail: HOSPITAL OUTPATIENT SURGERY
Discharge: HOME | End: 2021-04-01
Attending: ORTHOPAEDIC SURGERY | Admitting: ORTHOPAEDIC SURGERY

## 2021-04-01 ENCOUNTER — ANESTHESIA (OUTPATIENT)
Dept: PERIOP | Facility: HOSPITAL | Age: 51
End: 2021-04-01

## 2021-04-01 VITALS
OXYGEN SATURATION: 97 % | WEIGHT: 285.6 LBS | HEART RATE: 84 BPM | DIASTOLIC BLOOD PRESSURE: 63 MMHG | RESPIRATION RATE: 18 BRPM | TEMPERATURE: 97.3 F | SYSTOLIC BLOOD PRESSURE: 134 MMHG | BODY MASS INDEX: 44.83 KG/M2 | HEIGHT: 67 IN

## 2021-04-01 DIAGNOSIS — G56.02 CARPAL TUNNEL SYNDROME, LEFT: ICD-10-CM

## 2021-04-01 PROCEDURE — 25010000002 PROPOFOL 10 MG/ML EMULSION: Performed by: NURSE ANESTHETIST, CERTIFIED REGISTERED

## 2021-04-01 PROCEDURE — 25010000002 KETOROLAC TROMETHAMINE PER 15 MG: Performed by: NURSE ANESTHETIST, CERTIFIED REGISTERED

## 2021-04-01 PROCEDURE — 25010000002 ONDANSETRON PER 1 MG: Performed by: NURSE ANESTHETIST, CERTIFIED REGISTERED

## 2021-04-01 PROCEDURE — 25010000003 MEPERIDINE PER 100 MG: Performed by: NURSE ANESTHETIST, CERTIFIED REGISTERED

## 2021-04-01 PROCEDURE — 25010000002 FENTANYL CITRATE (PF) 100 MCG/2ML SOLUTION: Performed by: NURSE ANESTHETIST, CERTIFIED REGISTERED

## 2021-04-01 PROCEDURE — 25010000003 LIDOCAINE 1 % SOLUTION: Performed by: ORTHOPAEDIC SURGERY

## 2021-04-01 PROCEDURE — 25010000002 MIDAZOLAM PER 1 MG: Performed by: NURSE ANESTHETIST, CERTIFIED REGISTERED

## 2021-04-01 PROCEDURE — 64721 CARPAL TUNNEL SURGERY: CPT | Performed by: ORTHOPAEDIC SURGERY

## 2021-04-01 RX ORDER — PROPOFOL 10 MG/ML
VIAL (ML) INTRAVENOUS AS NEEDED
Status: DISCONTINUED | OUTPATIENT
Start: 2021-04-01 | End: 2021-04-01 | Stop reason: SURG

## 2021-04-01 RX ORDER — ONDANSETRON 2 MG/ML
4 INJECTION INTRAMUSCULAR; INTRAVENOUS AS NEEDED
Status: DISCONTINUED | OUTPATIENT
Start: 2021-04-01 | End: 2021-04-01 | Stop reason: HOSPADM

## 2021-04-01 RX ORDER — IPRATROPIUM BROMIDE AND ALBUTEROL SULFATE 2.5; .5 MG/3ML; MG/3ML
3 SOLUTION RESPIRATORY (INHALATION) ONCE AS NEEDED
Status: DISCONTINUED | OUTPATIENT
Start: 2021-04-01 | End: 2021-04-01 | Stop reason: HOSPADM

## 2021-04-01 RX ORDER — MIDAZOLAM HYDROCHLORIDE 1 MG/ML
1 INJECTION INTRAMUSCULAR; INTRAVENOUS
Status: DISCONTINUED | OUTPATIENT
Start: 2021-04-01 | End: 2021-04-01 | Stop reason: SDUPTHER

## 2021-04-01 RX ORDER — FENTANYL CITRATE 50 UG/ML
INJECTION, SOLUTION INTRAMUSCULAR; INTRAVENOUS AS NEEDED
Status: DISCONTINUED | OUTPATIENT
Start: 2021-04-01 | End: 2021-04-01 | Stop reason: SURG

## 2021-04-01 RX ORDER — SODIUM CHLORIDE 0.9 % (FLUSH) 0.9 %
10 SYRINGE (ML) INJECTION EVERY 12 HOURS SCHEDULED
Status: DISCONTINUED | OUTPATIENT
Start: 2021-04-01 | End: 2021-04-01 | Stop reason: HOSPADM

## 2021-04-01 RX ORDER — LIDOCAINE HYDROCHLORIDE 10 MG/ML
INJECTION, SOLUTION INFILTRATION; PERINEURAL AS NEEDED
Status: DISCONTINUED | OUTPATIENT
Start: 2021-04-01 | End: 2021-04-01 | Stop reason: HOSPADM

## 2021-04-01 RX ORDER — FAMOTIDINE 10 MG/ML
INJECTION, SOLUTION INTRAVENOUS AS NEEDED
Status: DISCONTINUED | OUTPATIENT
Start: 2021-04-01 | End: 2021-04-01 | Stop reason: SURG

## 2021-04-01 RX ORDER — MIDAZOLAM HYDROCHLORIDE 1 MG/ML
1 INJECTION INTRAMUSCULAR; INTRAVENOUS
Status: DISCONTINUED | OUTPATIENT
Start: 2021-04-01 | End: 2021-04-01 | Stop reason: HOSPADM

## 2021-04-01 RX ORDER — MEPERIDINE HYDROCHLORIDE 25 MG/ML
12.5 INJECTION INTRAMUSCULAR; INTRAVENOUS; SUBCUTANEOUS
Status: DISCONTINUED | OUTPATIENT
Start: 2021-04-01 | End: 2021-04-01 | Stop reason: HOSPADM

## 2021-04-01 RX ORDER — SODIUM CHLORIDE, SODIUM LACTATE, POTASSIUM CHLORIDE, CALCIUM CHLORIDE 600; 310; 30; 20 MG/100ML; MG/100ML; MG/100ML; MG/100ML
100 INJECTION, SOLUTION INTRAVENOUS ONCE AS NEEDED
Status: DISCONTINUED | OUTPATIENT
Start: 2021-04-01 | End: 2021-04-01 | Stop reason: HOSPADM

## 2021-04-01 RX ORDER — SODIUM CHLORIDE 0.9 % (FLUSH) 0.9 %
10 SYRINGE (ML) INJECTION AS NEEDED
Status: DISCONTINUED | OUTPATIENT
Start: 2021-04-01 | End: 2021-04-01 | Stop reason: HOSPADM

## 2021-04-01 RX ORDER — MIDAZOLAM HYDROCHLORIDE 1 MG/ML
2 INJECTION INTRAMUSCULAR; INTRAVENOUS
Status: DISCONTINUED | OUTPATIENT
Start: 2021-04-01 | End: 2021-04-01 | Stop reason: HOSPADM

## 2021-04-01 RX ORDER — SODIUM CHLORIDE, SODIUM LACTATE, POTASSIUM CHLORIDE, CALCIUM CHLORIDE 600; 310; 30; 20 MG/100ML; MG/100ML; MG/100ML; MG/100ML
125 INJECTION, SOLUTION INTRAVENOUS ONCE
Status: DISCONTINUED | OUTPATIENT
Start: 2021-04-01 | End: 2021-04-01 | Stop reason: SDUPTHER

## 2021-04-01 RX ORDER — LIDOCAINE HYDROCHLORIDE 20 MG/ML
INJECTION, SOLUTION INFILTRATION; PERINEURAL AS NEEDED
Status: DISCONTINUED | OUTPATIENT
Start: 2021-04-01 | End: 2021-04-01 | Stop reason: SURG

## 2021-04-01 RX ORDER — KETOROLAC TROMETHAMINE 30 MG/ML
30 INJECTION, SOLUTION INTRAMUSCULAR; INTRAVENOUS EVERY 6 HOURS PRN
Status: COMPLETED | OUTPATIENT
Start: 2021-04-01 | End: 2021-04-01

## 2021-04-01 RX ORDER — MIDAZOLAM HYDROCHLORIDE 1 MG/ML
INJECTION INTRAMUSCULAR; INTRAVENOUS AS NEEDED
Status: DISCONTINUED | OUTPATIENT
Start: 2021-04-01 | End: 2021-04-01 | Stop reason: SURG

## 2021-04-01 RX ORDER — FENTANYL CITRATE 50 UG/ML
50 INJECTION, SOLUTION INTRAMUSCULAR; INTRAVENOUS
Status: COMPLETED | OUTPATIENT
Start: 2021-04-01 | End: 2021-04-01

## 2021-04-01 RX ORDER — DROPERIDOL 2.5 MG/ML
0.62 INJECTION, SOLUTION INTRAMUSCULAR; INTRAVENOUS ONCE AS NEEDED
Status: DISCONTINUED | OUTPATIENT
Start: 2021-04-01 | End: 2021-04-01 | Stop reason: HOSPADM

## 2021-04-01 RX ORDER — OXYCODONE HYDROCHLORIDE AND ACETAMINOPHEN 5; 325 MG/1; MG/1
1 TABLET ORAL ONCE AS NEEDED
Status: COMPLETED | OUTPATIENT
Start: 2021-04-01 | End: 2021-04-01

## 2021-04-01 RX ORDER — BUPIVACAINE HYDROCHLORIDE 5 MG/ML
INJECTION, SOLUTION PERINEURAL AS NEEDED
Status: DISCONTINUED | OUTPATIENT
Start: 2021-04-01 | End: 2021-04-01 | Stop reason: HOSPADM

## 2021-04-01 RX ORDER — SODIUM CHLORIDE, SODIUM LACTATE, POTASSIUM CHLORIDE, CALCIUM CHLORIDE 600; 310; 30; 20 MG/100ML; MG/100ML; MG/100ML; MG/100ML
125 INJECTION, SOLUTION INTRAVENOUS ONCE
Status: COMPLETED | OUTPATIENT
Start: 2021-04-01 | End: 2021-04-01

## 2021-04-01 RX ORDER — OXYCODONE HYDROCHLORIDE AND ACETAMINOPHEN 5; 325 MG/1; MG/1
1 TABLET ORAL EVERY 4 HOURS PRN
Qty: 12 TABLET | Refills: 0 | Status: SHIPPED | OUTPATIENT
Start: 2021-04-01 | End: 2021-10-20 | Stop reason: ALTCHOICE

## 2021-04-01 RX ORDER — MAGNESIUM HYDROXIDE 1200 MG/15ML
LIQUID ORAL AS NEEDED
Status: DISCONTINUED | OUTPATIENT
Start: 2021-04-01 | End: 2021-04-01 | Stop reason: HOSPADM

## 2021-04-01 RX ORDER — MIDAZOLAM HYDROCHLORIDE 1 MG/ML
2 INJECTION INTRAMUSCULAR; INTRAVENOUS
Status: DISCONTINUED | OUTPATIENT
Start: 2021-04-01 | End: 2021-04-01 | Stop reason: SDUPTHER

## 2021-04-01 RX ORDER — ONDANSETRON 2 MG/ML
INJECTION INTRAMUSCULAR; INTRAVENOUS AS NEEDED
Status: DISCONTINUED | OUTPATIENT
Start: 2021-04-01 | End: 2021-04-01 | Stop reason: SURG

## 2021-04-01 RX ADMIN — SODIUM CHLORIDE, POTASSIUM CHLORIDE, SODIUM LACTATE AND CALCIUM CHLORIDE: 600; 310; 30; 20 INJECTION, SOLUTION INTRAVENOUS at 10:19

## 2021-04-01 RX ADMIN — ONDANSETRON 4 MG: 2 INJECTION INTRAMUSCULAR; INTRAVENOUS at 10:38

## 2021-04-01 RX ADMIN — FENTANYL CITRATE 50 MCG: 50 INJECTION INTRAMUSCULAR; INTRAVENOUS at 10:31

## 2021-04-01 RX ADMIN — OXYCODONE HYDROCHLORIDE AND ACETAMINOPHEN 1 TABLET: 5; 325 TABLET ORAL at 11:27

## 2021-04-01 RX ADMIN — MEPERIDINE HYDROCHLORIDE 12.5 MG: 25 INJECTION INTRAMUSCULAR; INTRAVENOUS; SUBCUTANEOUS at 11:11

## 2021-04-01 RX ADMIN — PROPOFOL 50 MG: 10 INJECTION, EMULSION INTRAVENOUS at 10:33

## 2021-04-01 RX ADMIN — KETOROLAC TROMETHAMINE 30 MG: 30 INJECTION, SOLUTION INTRAMUSCULAR at 11:08

## 2021-04-01 RX ADMIN — LIDOCAINE HYDROCHLORIDE 40 MG: 20 INJECTION, SOLUTION INFILTRATION; PERINEURAL at 10:26

## 2021-04-01 RX ADMIN — FAMOTIDINE 20 MG: 10 INJECTION INTRAVENOUS at 10:19

## 2021-04-01 RX ADMIN — FENTANYL CITRATE 50 MCG: 50 INJECTION INTRAMUSCULAR; INTRAVENOUS at 11:09

## 2021-04-01 RX ADMIN — FENTANYL CITRATE 50 MCG: 50 INJECTION INTRAMUSCULAR; INTRAVENOUS at 11:14

## 2021-04-01 RX ADMIN — MIDAZOLAM 2 MG: 1 INJECTION INTRAMUSCULAR; INTRAVENOUS at 10:19

## 2021-04-01 RX ADMIN — FENTANYL CITRATE 50 MCG: 50 INJECTION INTRAMUSCULAR; INTRAVENOUS at 10:26

## 2021-04-01 RX ADMIN — PROPOFOL 50 MG: 10 INJECTION, EMULSION INTRAVENOUS at 10:44

## 2021-04-01 NOTE — ANESTHESIA POSTPROCEDURE EVALUATION
Patient: Andres Cortez    Procedure Summary     Date: 04/01/21 Room / Location: [facility] OR 03 /  [facility] OR    Anesthesia Start: 1019 Anesthesia Stop: 1101    Procedure: LEFT CARPAL TUNNEL RELEASE (Left Wrist) Diagnosis:       Carpal tunnel syndrome, left      (Carpal tunnel syndrome, left [G56.02])    Surgeons: Fitz Curry MD Provider: Zeus Wilson MD    Anesthesia Type: general, MAC ASA Status: 3          Anesthesia Type: general, MAC    Vitals  Vitals Value Taken Time   /73 04/01/21 1102   Temp 97.4 °F (36.3 °C) 04/01/21 1102   Pulse 84 04/01/21 1102   Resp 13 04/01/21 1102   SpO2 100 % 04/01/21 1102           Post Anesthesia Care and Evaluation    Patient location during evaluation: bedside  Patient participation: complete - patient participated  Level of consciousness: awake and alert  Pain score: 1  Pain management: adequate  Airway patency: patent  Anesthetic complications: No anesthetic complications  PONV Status: none  Cardiovascular status: acceptable  Respiratory status: acceptable  Hydration status: acceptable

## 2021-04-01 NOTE — OP NOTE
CARPAL TUNNEL RELEASE  Procedure Note    Andrse Cortez  4/1/2021    Pre-op Diagnosis:   Carpal tunnel syndrome, left [G56.02]    Post-op Diagnosis:     Post-Op Diagnosis Codes:     * Carpal tunnel syndrome, left [G56.02]    Procedure(s):  LEFT CARPAL TUNNEL RELEASE    Surgeon(s):  Fitz Curry MD    Anesthesia: General    Operative technique: With patient in the operating theatre under general anesthesia with the left hand and arm sterilely prepped and draped the extremity was exsanguinated and the tourniquet inflated to 200 mmHg.  Palmar aspect left hand infiltrated with 5 cc of 0.5 Marcaine.  Longitudinal incision made beginning at the distal wrist crease extending 5 cm skin divided sharply palmar fascia was divided exposing the underlying transverse carpal ligament which was divided from distal to proximal protecting underlying median nerve.  Tourniquet was deflated median nerve noted to be moderately hyperemic.  Hemostasis obtained with electrocautery wound closed in a single layer 3-0 nylon vertical mattress suture sterile dressing applied she was taken to recovery room in stable condition.    Staff:   Circulator: Jaron Pinedo RN  Scrub Person: Aminata Melchor  Documenter: Luis Umanzor RN  Assistant: Grupo Forrest    Estimated Blood Loss: none    Specimens:   none               Implants/Grafts: none      Drains: None    Complications: none    Tourniquet time: 10  min    Fitz Curry MD     Date: 4/1/2021  Time: 10:54 EDT    Cc: Katlyn Cavanaugh APRN

## 2021-04-07 DIAGNOSIS — G56.02 CARPAL TUNNEL SYNDROME, LEFT: Primary | ICD-10-CM

## 2021-04-07 RX ORDER — OXYCODONE HYDROCHLORIDE AND ACETAMINOPHEN 5; 325 MG/1; MG/1
1 TABLET ORAL EVERY 6 HOURS PRN
Qty: 8 TABLET | Refills: 0 | Status: SHIPPED | OUTPATIENT
Start: 2021-04-07 | End: 2021-10-20 | Stop reason: ALTCHOICE

## 2021-04-08 ENCOUNTER — TELEPHONE (OUTPATIENT)
Dept: ORTHOPEDIC SURGERY | Facility: CLINIC | Age: 51
End: 2021-04-08

## 2021-04-12 ENCOUNTER — OFFICE VISIT (OUTPATIENT)
Dept: ORTHOPEDIC SURGERY | Facility: CLINIC | Age: 51
End: 2021-04-12

## 2021-04-12 VITALS — HEIGHT: 67 IN | WEIGHT: 286.6 LBS | BODY MASS INDEX: 44.98 KG/M2 | TEMPERATURE: 98.4 F

## 2021-04-12 DIAGNOSIS — Z09 POSTOP CHECK: Primary | ICD-10-CM

## 2021-04-12 PROCEDURE — 99024 POSTOP FOLLOW-UP VISIT: CPT | Performed by: ORTHOPAEDIC SURGERY

## 2021-04-12 NOTE — PROGRESS NOTES
Patient: Andres Cortez  YOB: 1970  Date of Encounter: 04/12/2021      Chief Complaint:   Chief Complaint   Patient presents with   • Left Hand - Post-op, Follow-up     04/01/21 (11 days post-op)     Fitz Curry MD     Left Carpal Tunnel Release - Left                HPI:  Andres Cortez, 51 y.o. female returns in postoperative follow-up carpal tunnel release April 1, 2021 right carpal tunnel release January 21, 2021.  She is doing well with both.  Both hands have improved sensation and less pain.      Medical History:  Patient Active Problem List   Diagnosis   • Chest pain   • Shortness of breath   • Essential hypertension   • Abnormal stress test   • Lower extremity edema   • Disease of thyroid gland   • Former tobacco use   • Vertigo   • Palpitations   • Carpal tunnel syndrome, bilateral   • Carpal tunnel syndrome, right   • S/P carpal tunnel release   • Carpal tunnel syndrome, left     Past Medical History:   Diagnosis Date   • Anxiety    • Arthritis    • Depression    • Disease of thyroid gland    • Elevated cholesterol    • Hypertension    • Hypothyroid    • Neuropathy    • Panic attacks    • Seizures (CMS/HCC)    • Sleep apnea     uses c-pap   • Vertigo, benign paroxysmal          Surgical History:  Past Surgical History:   Procedure Laterality Date   • ABDOMINAL SURGERY     • BACK SURGERY      with instrumentation   • CARDIAC CATHETERIZATION     • CARPAL TUNNEL RELEASE Right 1/21/2021    Procedure: RIGHT CARPAL TUNNEL RELEASE;  Surgeon: Fitz Curry MD;  Location: [facility];  Service: Orthopedics;  Laterality: Right;   • CARPAL TUNNEL RELEASE Left 4/1/2021    Procedure: LEFT CARPAL TUNNEL RELEASE;  Surgeon: Fitz Curry MD;  Location: [facility];  Service: Orthopedics;  Laterality: Left;   • CHOLECYSTECTOMY     • HAND SURGERY     • NECK SURGERY      with instrumentation       Examination:  Examination left hand demonstrates midline incision intact without  surrounding erythema.  Sensation is tacked to her thumb index and middle fingers.  She demonstrates full motion.        Assessment & Plan:  51 y.o. female presents follow-up carpal tunnel release left hand 04/01/2021 doing well. Her sutures are removed today she will resume regular activities.  She will follow-up in the future only with problems.       Diagnosis Plan   1. Postop check             Cc:  Katlyn Cavanaugh, PATRICIA              This document has been electronically signed by Fitz Curry MD   April 12, 2021 12:24 EDT

## 2021-06-07 ENCOUNTER — TRANSCRIBE ORDERS (OUTPATIENT)
Dept: ADMINISTRATIVE | Facility: HOSPITAL | Age: 51
End: 2021-06-07

## 2021-06-07 ENCOUNTER — HOSPITAL ENCOUNTER (OUTPATIENT)
Dept: GENERAL RADIOLOGY | Facility: HOSPITAL | Age: 51
Discharge: HOME | End: 2021-06-07
Admitting: PSYCHIATRY & NEUROLOGY

## 2021-06-07 DIAGNOSIS — Z98.1 ARTHRODESIS STATUS: ICD-10-CM

## 2021-06-07 DIAGNOSIS — Z98.1 ARTHRODESIS STATUS: Primary | ICD-10-CM

## 2021-06-07 PROCEDURE — 72050 X-RAY EXAM NECK SPINE 4/5VWS: CPT

## 2021-06-07 PROCEDURE — 72050 X-RAY EXAM NECK SPINE 4/5VWS: CPT | Performed by: RADIOLOGY

## 2021-07-14 ENCOUNTER — OFFICE VISIT (OUTPATIENT)
Dept: ORTHOPEDIC SURGERY | Facility: CLINIC | Age: 51
End: 2021-07-14

## 2021-07-14 ENCOUNTER — HOSPITAL ENCOUNTER (OUTPATIENT)
Dept: GENERAL RADIOLOGY | Facility: HOSPITAL | Age: 51
Discharge: HOME | End: 2021-07-14
Admitting: ORTHOPAEDIC SURGERY

## 2021-07-14 VITALS — WEIGHT: 274 LBS | BODY MASS INDEX: 43 KG/M2 | HEIGHT: 67 IN

## 2021-07-14 DIAGNOSIS — M79.642 HAND PAIN, LEFT: ICD-10-CM

## 2021-07-14 DIAGNOSIS — M65.4 RADIAL STYLOID TENOSYNOVITIS (DE QUERVAIN): Primary | ICD-10-CM

## 2021-07-14 PROCEDURE — 73130 X-RAY EXAM OF HAND: CPT

## 2021-07-14 PROCEDURE — 20550 NJX 1 TENDON SHEATH/LIGAMENT: CPT | Performed by: ORTHOPAEDIC SURGERY

## 2021-07-14 PROCEDURE — 99213 OFFICE O/P EST LOW 20 MIN: CPT | Performed by: ORTHOPAEDIC SURGERY

## 2021-07-14 PROCEDURE — 73130 X-RAY EXAM OF HAND: CPT | Performed by: RADIOLOGY

## 2021-07-14 RX ADMIN — LIDOCAINE HYDROCHLORIDE 5 ML: 20 INJECTION, SOLUTION INFILTRATION; PERINEURAL at 12:14

## 2021-07-14 RX ADMIN — METHYLPREDNISOLONE ACETATE 40 MG: 40 INJECTION, SUSPENSION INTRA-ARTICULAR; INTRALESIONAL; INTRAMUSCULAR; SOFT TISSUE at 12:14

## 2021-07-14 NOTE — PROGRESS NOTES
Established New Problem         Patient: Andres Cortez  YOB: 1970  Date of Encounter: 07/14/2021      Chief  Complaint:   Chief Complaint   Patient presents with   • Left Wrist - Pain, Edema, Follow-up     04/01/21 (3m 13d)     Fitz Curry MD    Left Carpal Tunnel Release - Left             HPI:  Andres Cortez, 51 y.o. female presents for evaluation of left thumb and wrist pain.  She underwent carpal tunnel release April 1 of 2021.  She continues to have some limited sensation to the tips of her fingers but describes the burning pain in her left hand has resolved.  Has noticed worsening left thumb pain over time.  Complains of pain with bending her wrist or thumb.      Medical History:  Patient Active Problem List   Diagnosis   • Chest pain   • Shortness of breath   • Essential hypertension   • Abnormal stress test   • Lower extremity edema   • Disease of thyroid gland   • Former tobacco use   • Vertigo   • Palpitations   • Carpal tunnel syndrome, bilateral   • Carpal tunnel syndrome, right   • S/P carpal tunnel release   • Carpal tunnel syndrome, left   • Radial styloid tenosynovitis (de quervain)     Past Medical History:   Diagnosis Date   • Anxiety    • Arthritis    • Depression    • Disease of thyroid gland    • Elevated cholesterol    • Hypertension    • Hypothyroid    • Myalgia    • Neuropathy    • Panic attacks    • Seizures (CMS/HCC)    • Sleep apnea     uses c-pap   • Vertigo, benign paroxysmal          Social History:  Social History     Socioeconomic History   • Marital status:      Spouse name: Not on file   • Number of children: Not on file   • Years of education: Not on file   • Highest education level: Not on file   Tobacco Use   • Smoking status: Former Smoker     Packs/day: 1.00     Years: 9.00     Pack years: 9.00     Types: Cigarettes   • Smokeless tobacco: Never Used   Vaping Use   • Vaping Use: Never used   Substance and Sexual Activity   • Alcohol use:  No   • Drug use: No   • Sexual activity: Defer     Birth control/protection: None         Current Medications:    Current Outpatient Medications:   •  aspirin 81 MG tablet, Take 1 tablet by mouth Daily., Disp: 30 tablet, Rfl: 6  •  atorvastatin (LIPITOR) 10 MG tablet, Take 10 mg by mouth Daily., Disp: , Rfl:   •  cefdinir (OMNICEF) 300 MG capsule, Take 1 capsule by mouth 2 (Two) Times a Day., Disp: 14 capsule, Rfl: 0  •  clonazePAM (KlonoPIN) 0.5 MG tablet, , Disp: , Rfl:   •  CloNIDine (CATAPRES) 0.1 MG tablet, Take 0.05-0.1 mg by mouth 2 (Two) Times a Day., Disp: , Rfl:   •  DULoxetine (CYMBALTA) 60 MG capsule, Take 60 mg by mouth Daily., Disp: , Rfl:   •  furosemide (LASIX) 20 MG tablet, Take 1 tablet by mouth 2 (Two) Times a Day., Disp: 60 tablet, Rfl: 6  •  gemfibrozil (LOPID) 600 MG tablet, Take 600 mg by mouth Daily., Disp: , Rfl:   •  levothyroxine (SYNTHROID, LEVOTHROID) 100 MCG tablet, Take 100 mcg by mouth Daily., Disp: , Rfl:   •  meclizine (ANTIVERT) 25 MG tablet, Take 1 tablet by mouth 3 (Three) Times a Day As Needed for dizziness., Disp: 30 tablet, Rfl: 0  •  meloxicam (MOBIC) 15 MG tablet, Take 15 mg by mouth every night at bedtime., Disp: , Rfl:   •  metoprolol succinate XL (TOPROL-XL) 50 MG 24 hr tablet, Take 50 mg by mouth Every Night., Disp: , Rfl:   •  olmesartan (BENICAR) 40 MG tablet, Take 40 mg by mouth Daily., Disp: , Rfl:   •  Omega-3 Fatty Acids (fish oil) 1000 MG capsule capsule, Take 1,000 mg by mouth 2 (Two) Times a Day With Meals., Disp: , Rfl:   •  ondansetron (ZOFRAN) 4 MG tablet, Take 1 tablet by mouth Every 8 (Eight) Hours As Needed for Nausea or Vomiting., Disp: 30 tablet, Rfl: 0  •  OXcarbazepine (TRILEPTAL) 300 MG tablet, Take 600 mg by mouth 2 (Two) Times a Day., Disp: , Rfl:   •  oxyCODONE-acetaminophen (PERCOCET) 5-325 MG per tablet, Take 1 tablet by mouth Every 4 (Four) Hours As Needed (Pain)., Disp: 12 tablet, Rfl: 0  •  oxyCODONE-acetaminophen (PERCOCET) 5-325 MG per  tablet, Take 1 tablet by mouth Every 6 (Six) Hours As Needed (Pain)., Disp: 8 tablet, Rfl: 0  •  potassium chloride (MICRO-K) 10 MEQ CR capsule, 10 mEq Daily., Disp: , Rfl:   •  pregabalin (LYRICA) 300 MG capsule, , Disp: , Rfl:   •  promethazine (PHENERGAN) 25 MG tablet, TAKE 1 TABLET BY MOUTH EVERY 4 TO 6 HOURS AS NEEDED FORNAUSEA AND VOMITING, Disp: , Rfl:   •  tiZANidine (ZANAFLEX) 4 MG tablet, Take 40 mg by mouth 3 (Three) Times a Day As Needed., Disp: , Rfl:   •  vitamin D3 125 MCG (5000 UT) capsule capsule, Take 1 capsule by mouth Daily., Disp: , Rfl:       Allergies:  No Known Allergies      Family History:  Family History   Problem Relation Age of Onset   • Stroke Mother    • Heart attack Mother    • Heart failure Father    • Diabetes Father    • Heart failure Brother          Surgical History:  Past Surgical History:   Procedure Laterality Date   • ABDOMINAL SURGERY     • BACK SURGERY      with instrumentation   • CARDIAC CATHETERIZATION     • CARPAL TUNNEL RELEASE Right 1/21/2021    Procedure: RIGHT CARPAL TUNNEL RELEASE;  Surgeon: Fitz Curry MD;  Location: [facility] OR;  Service: Orthopedics;  Laterality: Right;   • CARPAL TUNNEL RELEASE Left 4/1/2021    Procedure: LEFT CARPAL TUNNEL RELEASE;  Surgeon: Fitz Curry MD;  Location: [facility] OR;  Service: Orthopedics;  Laterality: Left;   • CHOLECYSTECTOMY     • HAND SURGERY     • NECK SURGERY      with instrumentation         Radiology:   XR Hand 3+ View Left    Result Date: 7/14/2021  No acute bony abnormality.  This report was finalized on 7/14/2021 10:57 AM by Dr. Elmer Lara MD.      Radiographs of left hand by my review and by radiology report is negative.      Examination:   Examination left hand and wrist reveals midline incision from carpal tunnel release to be intact with minimal localized tenderness no localized swelling.  She has active motion of all fingers.  With manipulation of her thumb she has negative grind test but  "moderately positive Finkelstein's test.      Assessment & Plan:   51 y.o. female presents follow-up carpal tunnel release doing well overall with no complaint of pain in her left thumb with findings consistent with de Quervain's tenosynovitis today she is provided steroid injection Depo-Medrol 40 mg with lidocaine block to the first dorsal wrist compartment.  She is also provided thumb spica brace.  We will follow-up in 6 weeks.         Diagnosis Plan   1. Radial styloid tenosynovitis (de quervain)     2. Hand pain, left  XR Hand 3+ View Left         Injection Tendon or Ligament    Date/Time: 7/14/2021 12:14 PM  Performed by: Fitz Curry MD  Authorized by: Fitz Curry MD   Consent: Verbal consent obtained. Written consent obtained.  Risks and benefits: risks, benefits and alternatives were discussed  Consent given by: patient  Patient understanding: patient states understanding of the procedure being performed  Patient consent: the patient's understanding of the procedure matches consent given  Procedure consent: procedure consent matches procedure scheduled  Test results: test results available and properly labeled  Site marked: the operative site was marked  Imaging studies: imaging studies available  Patient identity confirmed: verbally with patient  Time out: Immediately prior to procedure a \"time out\" was called to verify the correct patient, procedure, equipment, support staff and site/side marked as required.  Preparation: Patient was prepped and draped in the usual sterile fashion.  Local anesthesia used: yes  Anesthesia: local infiltration    Anesthesia:  Local anesthesia used: yes  Anesthetic total: 5 mL    Sedation:  Patient sedated: no    Patient tolerance: Patient tolerated the procedure well with no immediate complications  Medications administered: 5 mL lidocaine 2%; 40 mg methylPREDNISolone acetate 40 MG/ML              Cc:  Katlyn Cavanaugh, APRN              This document " has been electronically signed by Fitz Curry MD   July 19, 2021 12:13 EDT

## 2021-07-19 PROBLEM — M65.4 RADIAL STYLOID TENOSYNOVITIS (DE QUERVAIN): Status: ACTIVE | Noted: 2021-07-19

## 2021-07-19 RX ORDER — LIDOCAINE HYDROCHLORIDE 20 MG/ML
5 INJECTION, SOLUTION INFILTRATION; PERINEURAL
Status: COMPLETED | OUTPATIENT
Start: 2021-07-14 | End: 2021-07-14

## 2021-07-19 RX ORDER — METHYLPREDNISOLONE ACETATE 40 MG/ML
40 INJECTION, SUSPENSION INTRA-ARTICULAR; INTRALESIONAL; INTRAMUSCULAR; SOFT TISSUE
Status: COMPLETED | OUTPATIENT
Start: 2021-07-14 | End: 2021-07-14

## 2021-10-20 ENCOUNTER — OFFICE VISIT (OUTPATIENT)
Dept: ORTHOPEDIC SURGERY | Facility: CLINIC | Age: 51
End: 2021-10-20

## 2021-10-20 VITALS
BODY MASS INDEX: 43 KG/M2 | HEIGHT: 67 IN | WEIGHT: 274 LBS | HEART RATE: 86 BPM | DIASTOLIC BLOOD PRESSURE: 95 MMHG | TEMPERATURE: 98 F | SYSTOLIC BLOOD PRESSURE: 150 MMHG

## 2021-10-20 DIAGNOSIS — M65.4 RADIAL STYLOID TENOSYNOVITIS (DE QUERVAIN): Primary | ICD-10-CM

## 2021-10-20 DIAGNOSIS — Z01.818 PREOPERATIVE TESTING: ICD-10-CM

## 2021-10-20 PROCEDURE — 99214 OFFICE O/P EST MOD 30 MIN: CPT | Performed by: ORTHOPAEDIC SURGERY

## 2021-10-20 NOTE — PROGRESS NOTES
History and Physical      Patient: Andres Cortez  YOB: 1970  Date of Encounter: 10/20/2021      Chief Complaint:   Chief Complaint   Patient presents with   • Left Wrist - Follow-up, Pain           HPI:   Andres Cortez, 51 y.o. female, presents in follow-up with left thumb and wrist pain.  She was diagnosed with de Quervain's tenosynovitis last visit July 14, 2021.  She was provided steroid injection first dorsal wrist compartment reports relief for several weeks with symptoms slowly returning.  Now complains of severe left wrist pain has difficulty using her thumb for any activity.  She no longer experiences numbness to her fingers following carpal tunnel release April 1, 2021.        Active Problem List:  Patient Active Problem List   Diagnosis   • Chest pain   • Shortness of breath   • Essential hypertension   • Abnormal stress test   • Lower extremity edema   • Disease of thyroid gland   • Former tobacco use   • Vertigo   • Palpitations   • Carpal tunnel syndrome, bilateral   • Carpal tunnel syndrome, right   • S/P carpal tunnel release   • Carpal tunnel syndrome, left   • Radial styloid tenosynovitis (de quervain)           Past Medical History:  Past Medical History:   Diagnosis Date   • Anxiety    • Arthritis    • Depression    • Disease of thyroid gland    • Elevated cholesterol    • Hypertension    • Hypothyroid    • Myalgia    • Neuropathy    • Panic attacks    • Seizures (HCC)    • Sleep apnea     uses c-pap   • Vertigo, benign paroxysmal            Past Surgical History:  Past Surgical History:   Procedure Laterality Date   • ABDOMINAL SURGERY     • BACK SURGERY      with instrumentation   • CARDIAC CATHETERIZATION     • CARPAL TUNNEL RELEASE Right 1/21/2021    Procedure: RIGHT CARPAL TUNNEL RELEASE;  Surgeon: Fitz Curry MD;  Location: [facility];  Service: Orthopedics;  Laterality: Right;   • CARPAL TUNNEL RELEASE Left 4/1/2021    Procedure: LEFT CARPAL TUNNEL RELEASE;   Surgeon: Fitz Curry MD;  Location: [facility];  Service: Orthopedics;  Laterality: Left;   • CHOLECYSTECTOMY     • HAND SURGERY     • NECK SURGERY      with instrumentation           Family History:  Family History   Problem Relation Age of Onset   • Stroke Mother    • Heart attack Mother    • Heart failure Father    • Diabetes Father    • Heart failure Brother            Social History:  Social History     Socioeconomic History   • Marital status:    Tobacco Use   • Smoking status: Former Smoker     Packs/day: 1.00     Years: 9.00     Pack years: 9.00     Types: Cigarettes   • Smokeless tobacco: Never Used   Vaping Use   • Vaping Use: Never used   Substance and Sexual Activity   • Alcohol use: No   • Drug use: No   • Sexual activity: Defer     Birth control/protection: None     Body mass index is 42.91 kg/m².        Medications:  Current Outpatient Medications   Medication Sig Dispense Refill   • aspirin 81 MG tablet Take 1 tablet by mouth Daily. 30 tablet 6   • atorvastatin (LIPITOR) 10 MG tablet Take 10 mg by mouth Daily.     • clonazePAM (KlonoPIN) 0.5 MG tablet      • CloNIDine (CATAPRES) 0.1 MG tablet Take 0.05-0.1 mg by mouth 2 (Two) Times a Day.     • DULoxetine (CYMBALTA) 60 MG capsule Take 90 mg by mouth Daily.     • furosemide (LASIX) 20 MG tablet Take 1 tablet by mouth 2 (Two) Times a Day. 60 tablet 6   • gemfibrozil (LOPID) 600 MG tablet Take 600 mg by mouth Daily.     • levothyroxine (SYNTHROID, LEVOTHROID) 100 MCG tablet Take 112 mcg by mouth Daily.     • meclizine (ANTIVERT) 25 MG tablet Take 1 tablet by mouth 3 (Three) Times a Day As Needed for dizziness. 30 tablet 0   • meloxicam (MOBIC) 15 MG tablet Take 15 mg by mouth every night at bedtime.     • metoprolol succinate XL (TOPROL-XL) 50 MG 24 hr tablet Take 50 mg by mouth Every Night.     • olmesartan (BENICAR) 40 MG tablet Take 40 mg by mouth Daily.     • Omega-3 Fatty Acids (fish oil) 1000 MG capsule capsule Take 1,000 mg by  mouth 2 (Two) Times a Day With Meals.     • OXcarbazepine (TRILEPTAL) 300 MG tablet Take 600 mg by mouth 3 (Three) Times a Day As Needed.     • potassium chloride (MICRO-K) 10 MEQ CR capsule 10 mEq Daily.     • pregabalin (LYRICA) 300 MG capsule      • promethazine (PHENERGAN) 25 MG tablet TAKE 1 TABLET BY MOUTH EVERY 4 TO 6 HOURS AS NEEDED FORNAUSEA AND VOMITING     • tiZANidine (ZANAFLEX) 4 MG tablet Take 40 mg by mouth 3 (Three) Times a Day As Needed.     • vitamin D3 125 MCG (5000 UT) capsule capsule Take 1 capsule by mouth Daily.       No current facility-administered medications for this visit.           Allergies:  No Known Allergies        Review of Systems:   Review of Systems   HENT: Negative.    Eyes: Negative.    Respiratory: Positive for apnea.    Cardiovascular: Negative.    Gastrointestinal: Negative.    Endocrine: Negative.    Genitourinary: Negative.    Musculoskeletal: Positive for arthralgias, back pain, gait problem, joint swelling and neck pain.   Skin: Negative.    Allergic/Immunologic: Negative.    Neurological: Positive for weakness and numbness.        Vertigo   Hematological: Negative.    Psychiatric/Behavioral: Positive for dysphoric mood and sleep disturbance. The patient is nervous/anxious.            Physical Exam:   Physical Exam  Vitals and nursing note reviewed.   Constitutional:       General: She is not in acute distress.     Appearance: She is not diaphoretic.   HENT:      Head: Normocephalic and atraumatic.      Right Ear: External ear normal.      Left Ear: External ear normal.   Eyes:      General:         Right eye: No discharge.         Left eye: No discharge.      Conjunctiva/sclera: Conjunctivae normal.   Cardiovascular:      Rate and Rhythm: Normal rate and regular rhythm.      Heart sounds: Normal heart sounds. No murmur heard.      Pulmonary:      Effort: Pulmonary effort is normal. No respiratory distress.      Breath sounds: Normal breath sounds. No wheezing.  "  Abdominal:      General: There is no distension.      Palpations: Abdomen is soft.      Tenderness: There is no guarding.   Musculoskeletal:      Cervical back: Normal range of motion and neck supple.   Skin:     General: Skin is warm and dry.      Capillary Refill: Capillary refill takes less than 2 seconds.   Neurological:      Mental Status: She is alert and oriented to person, place, and time.   Psychiatric:         Behavior: Behavior normal.         Thought Content: Thought content normal.         Judgment: Judgment normal.       GENERAL: 51 y.o. female, alert and oriented X 3 in no acute distress.   Visit Vitals  /95   Pulse 86   Temp 98 °F (36.7 °C)   Ht 170.2 cm (67\")   Wt 124 kg (274 lb)   BMI 42.91 kg/m²         Musculoskeletal:   Examination left wrist reveals palmar incision previous carpal tunnel release fully intact she has normal sensation.  She has exquisite tenderness to palpation along the radial styloid and pain with Finkelstein's maneuver.  Demonstrates negative grind test.  Neurovascular examination intact.        Assessment & Plan:   51 y.o. female presents with left wrist pain findings consistent with de Quervain's tenosynovitis.  She has previously received steroid injection with limited response.  After discussion she wishes to proceed with proposed release first dorsal wrist compartment.  Surgery is scheduled [facility] October 26, 2021.      ICD-10-CM ICD-9-CM   1. Radial styloid tenosynovitis (de quervain)  M65.4 727.04   2. Preoperative testing  Z01.818 V72.84           Cc:   Katlyn Cavanaugh APRN              This document has been electronically signed by Fizt Curry MD   October 22, 2021 19:31 EDT                "

## 2021-10-20 NOTE — PROGRESS NOTES
Follow-up Visit         Patient: Andres Cortez  YOB: 1970  Date of Encounter: 10/20/2021      Chief  Complaint: No chief complaint on file.        HPI:  Andres Cortez, 51 y.o. female presents      Medical History:  Patient Active Problem List   Diagnosis   • Chest pain   • Shortness of breath   • Essential hypertension   • Abnormal stress test   • Lower extremity edema   • Disease of thyroid gland   • Former tobacco use   • Vertigo   • Palpitations   • Carpal tunnel syndrome, bilateral   • Carpal tunnel syndrome, right   • S/P carpal tunnel release   • Carpal tunnel syndrome, left   • Radial styloid tenosynovitis (de quervain)     Past Medical History:   Diagnosis Date   • Anxiety    • Arthritis    • Depression    • Disease of thyroid gland    • Elevated cholesterol    • Hypertension    • Hypothyroid    • Myalgia    • Neuropathy    • Panic attacks    • Seizures (CMS/HCC)    • Sleep apnea     uses c-pap   • Vertigo, benign paroxysmal          Social History:  Social History     Socioeconomic History   • Marital status:    Tobacco Use   • Smoking status: Former Smoker     Packs/day: 1.00     Years: 9.00     Pack years: 9.00     Types: Cigarettes   • Smokeless tobacco: Never Used   Vaping Use   • Vaping Use: Never used   Substance and Sexual Activity   • Alcohol use: No   • Drug use: No   • Sexual activity: Defer     Birth control/protection: None         Current Medications:    Current Outpatient Medications:   •  aspirin 81 MG tablet, Take 1 tablet by mouth Daily., Disp: 30 tablet, Rfl: 6  •  atorvastatin (LIPITOR) 10 MG tablet, Take 10 mg by mouth Daily., Disp: , Rfl:   •  cefdinir (OMNICEF) 300 MG capsule, Take 1 capsule by mouth 2 (Two) Times a Day., Disp: 14 capsule, Rfl: 0  •  clonazePAM (KlonoPIN) 0.5 MG tablet, , Disp: , Rfl:   •  CloNIDine (CATAPRES) 0.1 MG tablet, Take 0.05-0.1 mg by mouth 2 (Two) Times a Day., Disp: , Rfl:   •  DULoxetine (CYMBALTA) 60 MG capsule, Take 60 mg by  mouth Daily., Disp: , Rfl:   •  furosemide (LASIX) 20 MG tablet, Take 1 tablet by mouth 2 (Two) Times a Day., Disp: 60 tablet, Rfl: 6  •  gemfibrozil (LOPID) 600 MG tablet, Take 600 mg by mouth Daily., Disp: , Rfl:   •  levothyroxine (SYNTHROID, LEVOTHROID) 100 MCG tablet, Take 100 mcg by mouth Daily., Disp: , Rfl:   •  meclizine (ANTIVERT) 25 MG tablet, Take 1 tablet by mouth 3 (Three) Times a Day As Needed for dizziness., Disp: 30 tablet, Rfl: 0  •  meloxicam (MOBIC) 15 MG tablet, Take 15 mg by mouth every night at bedtime., Disp: , Rfl:   •  metoprolol succinate XL (TOPROL-XL) 50 MG 24 hr tablet, Take 50 mg by mouth Every Night., Disp: , Rfl:   •  olmesartan (BENICAR) 40 MG tablet, Take 40 mg by mouth Daily., Disp: , Rfl:   •  Omega-3 Fatty Acids (fish oil) 1000 MG capsule capsule, Take 1,000 mg by mouth 2 (Two) Times a Day With Meals., Disp: , Rfl:   •  ondansetron (ZOFRAN) 4 MG tablet, Take 1 tablet by mouth Every 8 (Eight) Hours As Needed for Nausea or Vomiting., Disp: 30 tablet, Rfl: 0  •  OXcarbazepine (TRILEPTAL) 300 MG tablet, Take 600 mg by mouth 2 (Two) Times a Day., Disp: , Rfl:   •  oxyCODONE-acetaminophen (PERCOCET) 5-325 MG per tablet, Take 1 tablet by mouth Every 4 (Four) Hours As Needed (Pain)., Disp: 12 tablet, Rfl: 0  •  oxyCODONE-acetaminophen (PERCOCET) 5-325 MG per tablet, Take 1 tablet by mouth Every 6 (Six) Hours As Needed (Pain)., Disp: 8 tablet, Rfl: 0  •  potassium chloride (MICRO-K) 10 MEQ CR capsule, 10 mEq Daily., Disp: , Rfl:   •  pregabalin (LYRICA) 300 MG capsule, , Disp: , Rfl:   •  promethazine (PHENERGAN) 25 MG tablet, TAKE 1 TABLET BY MOUTH EVERY 4 TO 6 HOURS AS NEEDED FORNAUSEA AND VOMITING, Disp: , Rfl:   •  tiZANidine (ZANAFLEX) 4 MG tablet, Take 40 mg by mouth 3 (Three) Times a Day As Needed., Disp: , Rfl:   •  vitamin D3 125 MCG (5000 UT) capsule capsule, Take 1 capsule by mouth Daily., Disp: , Rfl:       Allergies:  No Known Allergies      Family History:  Family History    Problem Relation Age of Onset   • Stroke Mother    • Heart attack Mother    • Heart failure Father    • Diabetes Father    • Heart failure Brother          Surgical History:  Past Surgical History:   Procedure Laterality Date   • ABDOMINAL SURGERY     • BACK SURGERY      with instrumentation   • CARDIAC CATHETERIZATION     • CARPAL TUNNEL RELEASE Right 1/21/2021    Procedure: RIGHT CARPAL TUNNEL RELEASE;  Surgeon: Fitz Curry MD;  Location: [facility];  Service: Orthopedics;  Laterality: Right;   • CARPAL TUNNEL RELEASE Left 4/1/2021    Procedure: LEFT CARPAL TUNNEL RELEASE;  Surgeon: Fitz Curry MD;  Location: [facility] OR;  Service: Orthopedics;  Laterality: Left;   • CHOLECYSTECTOMY     • HAND SURGERY     • NECK SURGERY      with instrumentation         Radiology:   No radiology results for the last 30 days.        Examination:   Examination         Assessment & Plan:   51 y.o. female presents         No diagnosis found.      Procedures        Cc:  Katlyn Cavanaugh, PATRICIA              This document has been electronically signed by Fitz Curry MD   October 20, 2021 10:03 EDT

## 2021-10-22 ENCOUNTER — PRE-ADMISSION TESTING (OUTPATIENT)
Dept: PREADMISSION TESTING | Facility: HOSPITAL | Age: 51
End: 2021-10-22

## 2021-10-22 ENCOUNTER — LAB (OUTPATIENT)
Dept: LAB | Facility: HOSPITAL | Age: 51
End: 2021-10-22

## 2021-10-22 DIAGNOSIS — M65.4 RADIAL STYLOID TENOSYNOVITIS (DE QUERVAIN): ICD-10-CM

## 2021-10-22 DIAGNOSIS — Z01.818 PRE-OPERATIVE CLEARANCE: Primary | ICD-10-CM

## 2021-10-22 LAB
ANION GAP SERPL CALCULATED.3IONS-SCNC: 10.6 MMOL/L (ref 5–15)
BUN SERPL-MCNC: 8 MG/DL (ref 6–20)
BUN/CREAT SERPL: 13.3 (ref 7–25)
CALCIUM SPEC-SCNC: 9.4 MG/DL (ref 8.6–10.5)
CHLORIDE SERPL-SCNC: 106 MMOL/L (ref 98–107)
CO2 SERPL-SCNC: 23.4 MMOL/L (ref 22–29)
CREAT SERPL-MCNC: 0.6 MG/DL (ref 0.57–1)
DEPRECATED RDW RBC AUTO: 50.4 FL (ref 37–54)
ERYTHROCYTE [DISTWIDTH] IN BLOOD BY AUTOMATED COUNT: 15.3 % (ref 12.3–15.4)
GFR SERPL CREATININE-BSD FRML MDRD: 105 ML/MIN/1.73
GLUCOSE SERPL-MCNC: 103 MG/DL (ref 65–99)
HCT VFR BLD AUTO: 44.3 % (ref 34–46.6)
HGB BLD-MCNC: 14.2 G/DL (ref 12–15.9)
MCH RBC QN AUTO: 28.7 PG (ref 26.6–33)
MCHC RBC AUTO-ENTMCNC: 32.1 G/DL (ref 31.5–35.7)
MCV RBC AUTO: 89.5 FL (ref 79–97)
PLATELET # BLD AUTO: 436 10*3/MM3 (ref 140–450)
PMV BLD AUTO: 9.8 FL (ref 6–12)
POTASSIUM SERPL-SCNC: 4.3 MMOL/L (ref 3.5–5.2)
RBC # BLD AUTO: 4.95 10*6/MM3 (ref 3.77–5.28)
SARS-COV-2 RNA PNL SPEC NAA+PROBE: NOT DETECTED
SODIUM SERPL-SCNC: 140 MMOL/L (ref 136–145)
WBC # BLD AUTO: 14.47 10*3/MM3 (ref 3.4–10.8)

## 2021-10-22 PROCEDURE — 80048 BASIC METABOLIC PNL TOTAL CA: CPT

## 2021-10-22 PROCEDURE — C9803 HOPD COVID-19 SPEC COLLECT: HCPCS

## 2021-10-22 PROCEDURE — 85027 COMPLETE CBC AUTOMATED: CPT

## 2021-10-22 PROCEDURE — 36415 COLL VENOUS BLD VENIPUNCTURE: CPT

## 2021-10-22 PROCEDURE — U0004 COV-19 TEST NON-CDC HGH THRU: HCPCS

## 2021-10-22 NOTE — DISCHARGE INSTRUCTIONS
10/26/21  ARRIVAL TIME PER DR LIMA OFFICE    TAKE the following medications the morning of surgery:  All heart or blood pressure medications    HOLD all diabetic medications the morning of surgery as ordered by physician.    Please discontinue all blood thinners and anticoagulants (except aspirin) prior to surgery as per your surgeon and cardiologist instructions.  Aspirin may be continued up to the day prior to surgery.     CHLORHEXIDINE CLOTHS GIVEN WITH INSTRUCTIONS AND FORM TO RETURN TO HOSPITAL, IF APPLICABLE.    General Instructions:  · Do not eat or drink after midnight: includes water, mints, or gum. You may brush your teeth.  Dental appliances that are removable must be taken out day of surgery.  · Do not smoke, chew tobacco, or drink alcohol.  · Bring medications in original bottles, any inhalers and if applicable your C-PAP/BI-PAP machine.  · Bring any papers given to you in the doctor's office.  · Wear clean comfortable clothes and socks.  · Do not wear contact lenses or make-up. Bring a case for your glasses if applicable.  · Bring crutches or walker if applicable.  · Leave all other valuables and jewelry at home.    If you were given a blood bank ID arm band remember to bring it with you the day of surgery.    Preventing a Surgical Site Infection:  Shower the night before surgery (unless instructed other wise) using a fresh bar of anti-bacterial soap (such as Dial) and clean washcloth. Dry with a clean towel and dress in clean clothing.  For 2 to 3 days before surgery, avoid shaving with a razor near where you will have surgery because the razor can irritate skin and make it easier to develop an infection. Ask your surgeon if you will be receiving antibiotics prior to surgery.  Make sure you, your family, and all healthcare providers clear their hands with soap and water or an alcohol-based hand  before caring for you or your wound.  If at all possible, quit smoking as many days before  surgery as you can.    Day of surgery:  Upon arrival, a Pre-op nurse and Anesthesiologist will review your health history, obtain vital signs, and answer questions you may have. The only belongings needed at this time will be your home medications and if applicable your C-PAP/BI-PAP machine. If you are staying overnight your family can leave the rest of your belongings in the car and bring them to your room later. A Pre-op nurse will start an IV and you may receive medication in preparation for surgery, including something to help you relax. Your family will be able to see you in the Pre-op area. While you are in surgery your family should notify the waiting room  if they leave the waiting room area and provide a contact phone number.    Please be aware that surgery does come with discomfort. We want to make every effort to control your discomfort so please discuss any uncontrolled symptoms with your nurse. Your doctor will most likely have prescribed pain medications.    If you are going home after surgery you will receive individualized written care instructions before being discharged. A responsible adult must drive you to and from the hospital on the day of surgery and stay with you for 24 hours.    If you are staying overnight following surgery, you will be transported to your hospital room following the recovery period.  [facility] has all private rooms.    If you have any questions please call Pre-Admission Testing at [phone].  Deductibles and co-payments are collected on the day of service. Please be prepared to pay the required co-pay, deductible or deposit on the day of service as defined by your plan.    A RESPONSIBLE PERSON MUST REMAIN IN THE WAITING ROOM DURING YOUR PROCEDURE AND A RESPONSIBLE  MUST BE AVAILABLE UPON YOUR DISCHARGE.

## 2021-10-26 ENCOUNTER — HOSPITAL ENCOUNTER (OUTPATIENT)
Facility: HOSPITAL | Age: 51
Setting detail: HOSPITAL OUTPATIENT SURGERY
Discharge: HOME | End: 2021-10-26
Attending: ORTHOPAEDIC SURGERY | Admitting: ORTHOPAEDIC SURGERY

## 2021-10-26 ENCOUNTER — ANESTHESIA EVENT (OUTPATIENT)
Dept: PERIOP | Facility: HOSPITAL | Age: 51
End: 2021-10-26

## 2021-10-26 ENCOUNTER — ANESTHESIA (OUTPATIENT)
Dept: PERIOP | Facility: HOSPITAL | Age: 51
End: 2021-10-26

## 2021-10-26 VITALS
SYSTOLIC BLOOD PRESSURE: 135 MMHG | HEART RATE: 71 BPM | RESPIRATION RATE: 18 BRPM | BODY MASS INDEX: 44.57 KG/M2 | HEIGHT: 67 IN | OXYGEN SATURATION: 97 % | TEMPERATURE: 97.7 F | DIASTOLIC BLOOD PRESSURE: 88 MMHG | WEIGHT: 284 LBS

## 2021-10-26 DIAGNOSIS — M65.4 RADIAL STYLOID TENOSYNOVITIS (DE QUERVAIN): ICD-10-CM

## 2021-10-26 PROCEDURE — 25010000002 ONDANSETRON PER 1 MG: Performed by: NURSE ANESTHETIST, CERTIFIED REGISTERED

## 2021-10-26 PROCEDURE — 0 LIDOCAINE 1 % SOLUTION: Performed by: ORTHOPAEDIC SURGERY

## 2021-10-26 PROCEDURE — 25010000002 KETOROLAC TROMETHAMINE PER 15 MG: Performed by: NURSE ANESTHETIST, CERTIFIED REGISTERED

## 2021-10-26 PROCEDURE — 25010000002 MIDAZOLAM PER 1 MG: Performed by: NURSE ANESTHETIST, CERTIFIED REGISTERED

## 2021-10-26 PROCEDURE — 25000 INCISION OF TENDON SHEATH: CPT | Performed by: ORTHOPAEDIC SURGERY

## 2021-10-26 PROCEDURE — 25010000002 FENTANYL CITRATE (PF) 50 MCG/ML SOLUTION: Performed by: NURSE ANESTHETIST, CERTIFIED REGISTERED

## 2021-10-26 PROCEDURE — 25010000002 PROPOFOL 10 MG/ML EMULSION: Performed by: NURSE ANESTHETIST, CERTIFIED REGISTERED

## 2021-10-26 RX ORDER — DROPERIDOL 2.5 MG/ML
0.62 INJECTION, SOLUTION INTRAMUSCULAR; INTRAVENOUS ONCE AS NEEDED
Status: DISCONTINUED | OUTPATIENT
Start: 2021-10-26 | End: 2021-10-26 | Stop reason: HOSPADM

## 2021-10-26 RX ORDER — FAMOTIDINE 10 MG/ML
INJECTION, SOLUTION INTRAVENOUS AS NEEDED
Status: DISCONTINUED | OUTPATIENT
Start: 2021-10-26 | End: 2021-10-26 | Stop reason: SURG

## 2021-10-26 RX ORDER — MIDAZOLAM HYDROCHLORIDE 1 MG/ML
INJECTION INTRAMUSCULAR; INTRAVENOUS AS NEEDED
Status: DISCONTINUED | OUTPATIENT
Start: 2021-10-26 | End: 2021-10-26 | Stop reason: SURG

## 2021-10-26 RX ORDER — MIDAZOLAM HYDROCHLORIDE 1 MG/ML
1 INJECTION INTRAMUSCULAR; INTRAVENOUS
Status: DISCONTINUED | OUTPATIENT
Start: 2021-10-26 | End: 2021-10-26 | Stop reason: HOSPADM

## 2021-10-26 RX ORDER — MEPERIDINE HYDROCHLORIDE 25 MG/ML
12.5 INJECTION INTRAMUSCULAR; INTRAVENOUS; SUBCUTANEOUS
Status: DISCONTINUED | OUTPATIENT
Start: 2021-10-26 | End: 2021-10-26 | Stop reason: HOSPADM

## 2021-10-26 RX ORDER — ONDANSETRON 2 MG/ML
4 INJECTION INTRAMUSCULAR; INTRAVENOUS AS NEEDED
Status: DISCONTINUED | OUTPATIENT
Start: 2021-10-26 | End: 2021-10-26 | Stop reason: HOSPADM

## 2021-10-26 RX ORDER — BUPIVACAINE HYDROCHLORIDE 5 MG/ML
INJECTION, SOLUTION PERINEURAL AS NEEDED
Status: DISCONTINUED | OUTPATIENT
Start: 2021-10-26 | End: 2021-10-26 | Stop reason: HOSPADM

## 2021-10-26 RX ORDER — FENTANYL CITRATE 50 UG/ML
INJECTION, SOLUTION INTRAMUSCULAR; INTRAVENOUS AS NEEDED
Status: DISCONTINUED | OUTPATIENT
Start: 2021-10-26 | End: 2021-10-26 | Stop reason: SURG

## 2021-10-26 RX ORDER — OXCARBAZEPINE 300 MG/1
300 TABLET, FILM COATED ORAL ONCE
Status: COMPLETED | OUTPATIENT
Start: 2021-10-26 | End: 2021-10-26

## 2021-10-26 RX ORDER — LIDOCAINE HYDROCHLORIDE 20 MG/ML
INJECTION, SOLUTION INFILTRATION; PERINEURAL AS NEEDED
Status: DISCONTINUED | OUTPATIENT
Start: 2021-10-26 | End: 2021-10-26 | Stop reason: SURG

## 2021-10-26 RX ORDER — PREGABALIN 100 MG/1
300 CAPSULE ORAL ONCE
Status: COMPLETED | OUTPATIENT
Start: 2021-10-26 | End: 2021-10-26

## 2021-10-26 RX ORDER — SODIUM CHLORIDE, SODIUM LACTATE, POTASSIUM CHLORIDE, CALCIUM CHLORIDE 600; 310; 30; 20 MG/100ML; MG/100ML; MG/100ML; MG/100ML
INJECTION, SOLUTION INTRAVENOUS CONTINUOUS PRN
Status: DISCONTINUED | OUTPATIENT
Start: 2021-10-26 | End: 2021-10-26 | Stop reason: SURG

## 2021-10-26 RX ORDER — OXYCODONE HYDROCHLORIDE AND ACETAMINOPHEN 5; 325 MG/1; MG/1
1 TABLET ORAL ONCE AS NEEDED
Status: COMPLETED | OUTPATIENT
Start: 2021-10-26 | End: 2021-10-26

## 2021-10-26 RX ORDER — OXYCODONE HYDROCHLORIDE AND ACETAMINOPHEN 5; 325 MG/1; MG/1
1 TABLET ORAL EVERY 4 HOURS PRN
Qty: 8 TABLET | Refills: 0 | Status: SHIPPED | OUTPATIENT
Start: 2021-10-26 | End: 2021-11-10

## 2021-10-26 RX ORDER — LIDOCAINE HYDROCHLORIDE 10 MG/ML
INJECTION, SOLUTION INFILTRATION; PERINEURAL AS NEEDED
Status: DISCONTINUED | OUTPATIENT
Start: 2021-10-26 | End: 2021-10-26 | Stop reason: HOSPADM

## 2021-10-26 RX ORDER — KETOROLAC TROMETHAMINE 30 MG/ML
INJECTION, SOLUTION INTRAMUSCULAR; INTRAVENOUS AS NEEDED
Status: DISCONTINUED | OUTPATIENT
Start: 2021-10-26 | End: 2021-10-26 | Stop reason: SURG

## 2021-10-26 RX ORDER — SODIUM CHLORIDE, SODIUM LACTATE, POTASSIUM CHLORIDE, CALCIUM CHLORIDE 600; 310; 30; 20 MG/100ML; MG/100ML; MG/100ML; MG/100ML
100 INJECTION, SOLUTION INTRAVENOUS ONCE AS NEEDED
Status: DISCONTINUED | OUTPATIENT
Start: 2021-10-26 | End: 2021-10-26 | Stop reason: HOSPADM

## 2021-10-26 RX ORDER — MAGNESIUM HYDROXIDE 1200 MG/15ML
LIQUID ORAL AS NEEDED
Status: DISCONTINUED | OUTPATIENT
Start: 2021-10-26 | End: 2021-10-26 | Stop reason: HOSPADM

## 2021-10-26 RX ORDER — ONDANSETRON 2 MG/ML
INJECTION INTRAMUSCULAR; INTRAVENOUS AS NEEDED
Status: DISCONTINUED | OUTPATIENT
Start: 2021-10-26 | End: 2021-10-26 | Stop reason: SURG

## 2021-10-26 RX ORDER — SODIUM CHLORIDE, SODIUM LACTATE, POTASSIUM CHLORIDE, CALCIUM CHLORIDE 600; 310; 30; 20 MG/100ML; MG/100ML; MG/100ML; MG/100ML
125 INJECTION, SOLUTION INTRAVENOUS ONCE
Status: COMPLETED | OUTPATIENT
Start: 2021-10-26 | End: 2021-10-26

## 2021-10-26 RX ORDER — FENTANYL CITRATE 50 UG/ML
50 INJECTION, SOLUTION INTRAMUSCULAR; INTRAVENOUS
Status: DISCONTINUED | OUTPATIENT
Start: 2021-10-26 | End: 2021-10-26 | Stop reason: HOSPADM

## 2021-10-26 RX ORDER — IPRATROPIUM BROMIDE AND ALBUTEROL SULFATE 2.5; .5 MG/3ML; MG/3ML
3 SOLUTION RESPIRATORY (INHALATION) ONCE AS NEEDED
Status: DISCONTINUED | OUTPATIENT
Start: 2021-10-26 | End: 2021-10-26 | Stop reason: HOSPADM

## 2021-10-26 RX ORDER — SODIUM CHLORIDE 0.9 % (FLUSH) 0.9 %
10 SYRINGE (ML) INJECTION EVERY 12 HOURS SCHEDULED
Status: DISCONTINUED | OUTPATIENT
Start: 2021-10-26 | End: 2021-10-26 | Stop reason: HOSPADM

## 2021-10-26 RX ORDER — SODIUM CHLORIDE 0.9 % (FLUSH) 0.9 %
10 SYRINGE (ML) INJECTION AS NEEDED
Status: DISCONTINUED | OUTPATIENT
Start: 2021-10-26 | End: 2021-10-26 | Stop reason: HOSPADM

## 2021-10-26 RX ADMIN — KETOROLAC TROMETHAMINE 30 MG: 30 INJECTION, SOLUTION INTRAMUSCULAR at 10:55

## 2021-10-26 RX ADMIN — OXYCODONE HYDROCHLORIDE AND ACETAMINOPHEN 1 TABLET: 5; 325 TABLET ORAL at 12:02

## 2021-10-26 RX ADMIN — SODIUM CHLORIDE, POTASSIUM CHLORIDE, SODIUM LACTATE AND CALCIUM CHLORIDE: 600; 310; 30; 20 INJECTION, SOLUTION INTRAVENOUS at 10:47

## 2021-10-26 RX ADMIN — PROPOFOL 100 MCG/KG/MIN: 10 INJECTION, EMULSION INTRAVENOUS at 10:53

## 2021-10-26 RX ADMIN — SODIUM CHLORIDE, POTASSIUM CHLORIDE, SODIUM LACTATE AND CALCIUM CHLORIDE 125 ML/HR: 600; 310; 30; 20 INJECTION, SOLUTION INTRAVENOUS at 09:09

## 2021-10-26 RX ADMIN — OXCARBAZEPINE 300 MG: 300 TABLET, FILM COATED ORAL at 10:00

## 2021-10-26 RX ADMIN — PREGABALIN 300 MG: 100 CAPSULE ORAL at 10:00

## 2021-10-26 RX ADMIN — LIDOCAINE HYDROCHLORIDE 40 MG: 20 INJECTION, SOLUTION INFILTRATION; PERINEURAL at 10:53

## 2021-10-26 RX ADMIN — FAMOTIDINE 20 MG: 10 INJECTION INTRAVENOUS at 10:47

## 2021-10-26 RX ADMIN — FENTANYL CITRATE 100 MCG: 50 INJECTION INTRAMUSCULAR; INTRAVENOUS at 10:53

## 2021-10-26 RX ADMIN — ONDANSETRON 4 MG: 2 INJECTION INTRAMUSCULAR; INTRAVENOUS at 10:47

## 2021-10-26 RX ADMIN — MIDAZOLAM 2 MG: 1 INJECTION INTRAMUSCULAR; INTRAVENOUS at 10:53

## 2021-10-26 NOTE — ANESTHESIA POSTPROCEDURE EVALUATION
Patient: Andres Cortez    Procedure Summary     Date: 10/26/21 Room / Location: [facility] OR  /  [facility] OR    Anesthesia Start: 1047 Anesthesia Stop: 1141    Procedure: LEFT DEQUERVAIN RELEASE FIRST DORSAL WRIST COMPARTMENT (Left Hand) Diagnosis:       Radial styloid tenosynovitis (de quervain)      (Radial styloid tenosynovitis (de quervain) [M65.4])    Surgeons: Fitz Curry MD Provider: Hardy Spicer DO    Anesthesia Type: general ASA Status: 3          Anesthesia Type: general    Vitals  Vitals Value Taken Time   /88 10/26/21 1203   Temp 97.3 °F (36.3 °C) 10/26/21 1143   Pulse 76 10/26/21 1203   Resp 13 10/26/21 1203   SpO2 97 % 10/26/21 1203           Post Anesthesia Care and Evaluation    Patient location during evaluation: PHASE II  Patient participation: complete - patient participated  Level of consciousness: awake and alert  Pain score: 1  Pain management: adequate  Airway patency: patent  Anesthetic complications: No anesthetic complications  PONV Status: controlled  Cardiovascular status: acceptable  Respiratory status: acceptable  Hydration status: acceptable

## 2021-10-26 NOTE — ANESTHESIA PREPROCEDURE EVALUATION
Anesthesia Evaluation     Patient summary reviewed and Nursing notes reviewed   no history of anesthetic complications:               Airway   Mallampati: III  TM distance: >3 FB  Neck ROM: limited  No difficulty expected  Dental - normal exam     Pulmonary - normal exam   (+) asthma,shortness of breath, sleep apnea,   Cardiovascular - normal exam  Exercise tolerance: good (4-7 METS)    NYHA Classification: II    (+) hypertension, hyperlipidemia,       Neuro/Psych  (+) seizures, dizziness/light headedness, numbness, psychiatric history,     GI/Hepatic/Renal/Endo - negative ROS     Musculoskeletal     Abdominal  - normal exam    Bowel sounds: normal.   Substance History - negative use     OB/GYN negative ob/gyn ROS         Other   arthritis,                      Anesthesia Plan    ASA 3     general     intravenous induction     Anesthetic plan, all risks, benefits, and alternatives have been provided, discussed and informed consent has been obtained with: patient.    Plan discussed with CRNA.

## 2021-11-02 NOTE — OP NOTE
DEQUERVAIN RELEASE  Procedure Note    Andres Cortez  10/26/2021    Pre-op Diagnosis:   Radial styloid tenosynovitis (de quervain) [M65.4]    Post-op Diagnosis:     Post-Op Diagnosis Codes:     * Radial styloid tenosynovitis (de quervain) [M65.4]    Procedure(s):  LEFT DEQUERVAIN RELEASE FIRST DORSAL WRIST COMPARTMENT    Surgeon(s):  Fitz Curry MD    Anesthesia: General    Operative technique: With patient in the operating theatre under general IV sedation with left hand and arm sterilely prepped and draped and a tourniquet applied Trembley was exsanguinated tourniquet inflated to 200 mmHg.  4 cm longitudinal incision made directly over radial styloid extending proximally carried through skin sharply.  Only dissecting protecting sensory branch of the radial nerve the first dorsal wrist compartment was identified and then released underlying thumb extensor tendons exposed.  No other pathology identified.  The gait was deflated hemostasis obtained wound closed in layers with running 3-0 subcuticular for final closure with sterile dressing applied she was taken to recovery in stable condition.    Staff:   Circulator: Luis Umanzor RN  Scrub Person: Monica Tsang  Assistant: Grupo Forrest    Estimated Blood Loss: none    Specimens:   none               Implants/Grafts: none      Drains: None    Complications: none    Tourniquet time: 14 min    Fitz Curry MD     Date: 11/2/2021  Time: 15:18 EDT    Cc: Katlyn Cavanaugh APRN      [>50% of Time Spent on Counseling for ____] : Greater than 50% of the encounter time was spent on counseling for [unfilled] [Time Spent: ___ minutes] : I have spent [unfilled] minutes of face to face time with the patient

## 2021-11-10 ENCOUNTER — OFFICE VISIT (OUTPATIENT)
Dept: ORTHOPEDIC SURGERY | Facility: CLINIC | Age: 51
End: 2021-11-10

## 2021-11-10 DIAGNOSIS — Z09 POSTOP CHECK: Primary | ICD-10-CM

## 2021-11-10 PROCEDURE — 99024 POSTOP FOLLOW-UP VISIT: CPT | Performed by: ORTHOPAEDIC SURGERY

## 2021-11-10 RX ORDER — MONTELUKAST SODIUM 10 MG/1
10 TABLET ORAL NIGHTLY
COMMUNITY

## 2021-11-10 RX ORDER — TRAMADOL HYDROCHLORIDE 50 MG/1
50 TABLET ORAL EVERY 6 HOURS PRN
COMMUNITY

## 2021-11-10 NOTE — PROGRESS NOTES
Patient: Andres Cortez  YOB: 1970  Date of Encounter: 11/10/2021      Chief Complaint:   Chief Complaint   Patient presents with   • Left Wrist - Follow-up, Pain, Numbness     10/26/21 (2w 1d)     Fitz Curry MD    Left Dequervain Release First Dorsal Wrist Compartment - Left                  HPI:  Andres Cortez, 51 y.o. female returns in postoperative follow-up de Quervain's release left wrist.  She has removed her bandage.  She reports she is doing well pain is significantly improved.      Medical History:  Patient Active Problem List   Diagnosis   • Chest pain   • Shortness of breath   • Essential hypertension   • Abnormal stress test   • Lower extremity edema   • Disease of thyroid gland   • Former tobacco use   • Vertigo   • Palpitations   • Carpal tunnel syndrome, bilateral   • Carpal tunnel syndrome, right   • S/P carpal tunnel release   • Carpal tunnel syndrome, left   • Radial styloid tenosynovitis (de quervain)     Past Medical History:   Diagnosis Date   • Anxiety    • Arthritis    • Asthma    • Back injury    • Depression    • Disease of thyroid gland    • Elevated cholesterol    • Hypertension    • Hypothyroid    • Myalgia    • Neck injury    • Neuropathy    • Panic attacks    • Seizures (HCC)    • Sleep apnea     uses c-pap   • Vertigo, benign paroxysmal          Surgical History:  Past Surgical History:   Procedure Laterality Date   • ABDOMINAL SURGERY     • BACK SURGERY      with instrumentation   • CARDIAC CATHETERIZATION     • CARPAL TUNNEL RELEASE Right 1/21/2021    Procedure: RIGHT CARPAL TUNNEL RELEASE;  Surgeon: Fitz Curry MD;  Location: [facility];  Service: Orthopedics;  Laterality: Right;   • CARPAL TUNNEL RELEASE Left 4/1/2021    Procedure: LEFT CARPAL TUNNEL RELEASE;  Surgeon: Fitz Curry MD;  Location: [facility];  Service: Orthopedics;  Laterality: Left;   • CHOLECYSTECTOMY     • DEQUERVAIN RELEASE Left 10/26/2021    Procedure: LEFT  DEQUERVAIN RELEASE FIRST DORSAL WRIST COMPARTMENT;  Surgeon: Fitz Curry MD;  Location: [facility];  Service: Orthopedics;  Laterality: Left;   • HAND SURGERY     • NECK SURGERY      with instrumentation         Examination:  Examination left foot reveals longitudinal incision intact without surrounding erythema.  She has active flexion extension of her thumb without significant pain.        Assessment & Plan:  51 y.o. female presents follow-up de Quervain's release left wrist doing well now 15 days postop.  She will wear her brace as needed she is encouraged to resume regular activities.  She will follow-up as needed.       Diagnosis Plan   1. Postop check           Cc:  Katlyn Cavanaugh, PATRICIA              This document has been electronically signed by Fitz Curry MD   November 10, 2021 09:09 EST

## 2021-12-05 ENCOUNTER — HOSPITAL ENCOUNTER (EMERGENCY)
Facility: HOSPITAL | Age: 51
Discharge: HOME | End: 2021-12-05
Attending: EMERGENCY MEDICINE | Admitting: EMERGENCY MEDICINE

## 2021-12-05 ENCOUNTER — APPOINTMENT (OUTPATIENT)
Dept: GENERAL RADIOLOGY | Facility: HOSPITAL | Age: 51
End: 2021-12-05

## 2021-12-05 ENCOUNTER — APPOINTMENT (OUTPATIENT)
Dept: CT IMAGING | Facility: HOSPITAL | Age: 51
End: 2021-12-05

## 2021-12-05 VITALS
BODY MASS INDEX: 43.16 KG/M2 | TEMPERATURE: 97.3 F | HEIGHT: 67 IN | SYSTOLIC BLOOD PRESSURE: 127 MMHG | OXYGEN SATURATION: 98 % | WEIGHT: 275 LBS | DIASTOLIC BLOOD PRESSURE: 86 MMHG | HEART RATE: 87 BPM | RESPIRATION RATE: 18 BRPM

## 2021-12-05 DIAGNOSIS — J18.9 PNEUMONIA DUE TO INFECTIOUS ORGANISM, UNSPECIFIED LATERALITY, UNSPECIFIED PART OF LUNG: Primary | ICD-10-CM

## 2021-12-05 LAB
ALBUMIN SERPL-MCNC: 4.06 G/DL (ref 3.5–5.2)
ALBUMIN/GLOB SERPL: 1.2 G/DL
ALP SERPL-CCNC: 108 U/L (ref 39–117)
ALT SERPL W P-5'-P-CCNC: 35 U/L (ref 1–33)
ANION GAP SERPL CALCULATED.3IONS-SCNC: 14.9 MMOL/L (ref 5–15)
AST SERPL-CCNC: 31 U/L (ref 1–32)
B PARAPERT DNA SPEC QL NAA+PROBE: NOT DETECTED
B PERT DNA SPEC QL NAA+PROBE: NOT DETECTED
BASOPHILS # BLD AUTO: 0.08 10*3/MM3 (ref 0–0.2)
BASOPHILS NFR BLD AUTO: 1.1 % (ref 0–1.5)
BILIRUB SERPL-MCNC: <0.2 MG/DL (ref 0–1.2)
BILIRUB UR QL STRIP: NEGATIVE
BUN SERPL-MCNC: 11 MG/DL (ref 6–20)
BUN/CREAT SERPL: 16.4 (ref 7–25)
C PNEUM DNA NPH QL NAA+NON-PROBE: NOT DETECTED
CALCIUM SPEC-SCNC: 9.6 MG/DL (ref 8.6–10.5)
CHLORIDE SERPL-SCNC: 104 MMOL/L (ref 98–107)
CLARITY UR: CLEAR
CO2 SERPL-SCNC: 22.1 MMOL/L (ref 22–29)
COLOR UR: YELLOW
CREAT SERPL-MCNC: 0.67 MG/DL (ref 0.57–1)
CRP SERPL-MCNC: 1.62 MG/DL (ref 0–0.5)
D DIMER PPP FEU-MCNC: 0.62 MCGFEU/ML (ref 0–0.5)
D-LACTATE SERPL-SCNC: 1.1 MMOL/L (ref 0.5–2)
DEPRECATED RDW RBC AUTO: 49.2 FL (ref 37–54)
EOSINOPHIL # BLD AUTO: 0.19 10*3/MM3 (ref 0–0.4)
EOSINOPHIL NFR BLD AUTO: 2.5 % (ref 0.3–6.2)
ERYTHROCYTE [DISTWIDTH] IN BLOOD BY AUTOMATED COUNT: 15 % (ref 12.3–15.4)
FLUAV SUBTYP SPEC NAA+PROBE: NOT DETECTED
FLUBV RNA ISLT QL NAA+PROBE: NOT DETECTED
GFR SERPL CREATININE-BSD FRML MDRD: 92 ML/MIN/1.73
GLOBULIN UR ELPH-MCNC: 3.4 GM/DL
GLUCOSE SERPL-MCNC: 133 MG/DL (ref 65–99)
GLUCOSE UR STRIP-MCNC: NEGATIVE MG/DL
HADV DNA SPEC NAA+PROBE: NOT DETECTED
HCG SERPL QL: NEGATIVE
HCOV 229E RNA SPEC QL NAA+PROBE: NOT DETECTED
HCOV HKU1 RNA SPEC QL NAA+PROBE: NOT DETECTED
HCOV NL63 RNA SPEC QL NAA+PROBE: NOT DETECTED
HCOV OC43 RNA SPEC QL NAA+PROBE: NOT DETECTED
HCT VFR BLD AUTO: 46.2 % (ref 34–46.6)
HGB BLD-MCNC: 15.1 G/DL (ref 12–15.9)
HGB UR QL STRIP.AUTO: NEGATIVE
HMPV RNA NPH QL NAA+NON-PROBE: NOT DETECTED
HOLD SPECIMEN: NORMAL
HOLD SPECIMEN: NORMAL
HPIV1 RNA ISLT QL NAA+PROBE: NOT DETECTED
HPIV2 RNA SPEC QL NAA+PROBE: NOT DETECTED
HPIV3 RNA NPH QL NAA+PROBE: NOT DETECTED
HPIV4 P GENE NPH QL NAA+PROBE: NOT DETECTED
IMM GRANULOCYTES # BLD AUTO: 0.12 10*3/MM3 (ref 0–0.05)
IMM GRANULOCYTES NFR BLD AUTO: 1.6 % (ref 0–0.5)
KETONES UR QL STRIP: NEGATIVE
LEUKOCYTE ESTERASE UR QL STRIP.AUTO: NEGATIVE
LYMPHOCYTES # BLD AUTO: 1.32 10*3/MM3 (ref 0.7–3.1)
LYMPHOCYTES NFR BLD AUTO: 17.4 % (ref 19.6–45.3)
M PNEUMO IGG SER IA-ACNC: NOT DETECTED
MAGNESIUM SERPL-MCNC: 1.8 MG/DL (ref 1.6–2.6)
MCH RBC QN AUTO: 28.8 PG (ref 26.6–33)
MCHC RBC AUTO-ENTMCNC: 32.7 G/DL (ref 31.5–35.7)
MCV RBC AUTO: 88.2 FL (ref 79–97)
MONOCYTES # BLD AUTO: 1.22 10*3/MM3 (ref 0.1–0.9)
MONOCYTES NFR BLD AUTO: 16.1 % (ref 5–12)
NEUTROPHILS NFR BLD AUTO: 4.66 10*3/MM3 (ref 1.7–7)
NEUTROPHILS NFR BLD AUTO: 61.3 % (ref 42.7–76)
NITRITE UR QL STRIP: NEGATIVE
NRBC BLD AUTO-RTO: 0 /100 WBC (ref 0–0.2)
PH UR STRIP.AUTO: 6 [PH] (ref 5–8)
PLATELET # BLD AUTO: 387 10*3/MM3 (ref 140–450)
PMV BLD AUTO: 10 FL (ref 6–12)
POTASSIUM SERPL-SCNC: 4.1 MMOL/L (ref 3.5–5.2)
PROT SERPL-MCNC: 7.5 G/DL (ref 6–8.5)
PROT UR QL STRIP: NEGATIVE
QT INTERVAL: 368 MS
QTC INTERVAL: 457 MS
RBC # BLD AUTO: 5.24 10*6/MM3 (ref 3.77–5.28)
RHINOVIRUS RNA SPEC NAA+PROBE: NOT DETECTED
RSV RNA NPH QL NAA+NON-PROBE: NOT DETECTED
S PYO AG THROAT QL: NEGATIVE
SARS-COV-2 RNA NPH QL NAA+NON-PROBE: NOT DETECTED
SARS-COV-2 RNA RESP QL NAA+PROBE: NOT DETECTED
SODIUM SERPL-SCNC: 141 MMOL/L (ref 136–145)
SP GR UR STRIP: 1.01 (ref 1–1.03)
TROPONIN T SERPL-MCNC: <0.01 NG/ML (ref 0–0.03)
TSH SERPL DL<=0.05 MIU/L-ACNC: 3.53 UIU/ML (ref 0.27–4.2)
UROBILINOGEN UR QL STRIP: NORMAL
WBC NRBC COR # BLD: 7.59 10*3/MM3 (ref 3.4–10.8)
WHOLE BLOOD HOLD SPECIMEN: NORMAL
WHOLE BLOOD HOLD SPECIMEN: NORMAL

## 2021-12-05 PROCEDURE — 96375 TX/PRO/DX INJ NEW DRUG ADDON: CPT

## 2021-12-05 PROCEDURE — 96361 HYDRATE IV INFUSION ADD-ON: CPT

## 2021-12-05 PROCEDURE — 0202U NFCT DS 22 TRGT SARS-COV-2: CPT | Performed by: EMERGENCY MEDICINE

## 2021-12-05 PROCEDURE — 84703 CHORIONIC GONADOTROPIN ASSAY: CPT | Performed by: EMERGENCY MEDICINE

## 2021-12-05 PROCEDURE — 93005 ELECTROCARDIOGRAM TRACING: CPT | Performed by: EMERGENCY MEDICINE

## 2021-12-05 PROCEDURE — 71275 CT ANGIOGRAPHY CHEST: CPT

## 2021-12-05 PROCEDURE — 83735 ASSAY OF MAGNESIUM: CPT | Performed by: EMERGENCY MEDICINE

## 2021-12-05 PROCEDURE — 87040 BLOOD CULTURE FOR BACTERIA: CPT | Performed by: EMERGENCY MEDICINE

## 2021-12-05 PROCEDURE — 36415 COLL VENOUS BLD VENIPUNCTURE: CPT

## 2021-12-05 PROCEDURE — 83605 ASSAY OF LACTIC ACID: CPT | Performed by: EMERGENCY MEDICINE

## 2021-12-05 PROCEDURE — 84484 ASSAY OF TROPONIN QUANT: CPT | Performed by: EMERGENCY MEDICINE

## 2021-12-05 PROCEDURE — 71045 X-RAY EXAM CHEST 1 VIEW: CPT

## 2021-12-05 PROCEDURE — 87880 STREP A ASSAY W/OPTIC: CPT | Performed by: EMERGENCY MEDICINE

## 2021-12-05 PROCEDURE — 99284 EMERGENCY DEPT VISIT MOD MDM: CPT

## 2021-12-05 PROCEDURE — 25010000002 CEFTRIAXONE PER 250 MG: Performed by: EMERGENCY MEDICINE

## 2021-12-05 PROCEDURE — 93010 ELECTROCARDIOGRAM REPORT: CPT | Performed by: INTERNAL MEDICINE

## 2021-12-05 PROCEDURE — 80053 COMPREHEN METABOLIC PANEL: CPT | Performed by: EMERGENCY MEDICINE

## 2021-12-05 PROCEDURE — 85025 COMPLETE CBC W/AUTO DIFF WBC: CPT | Performed by: EMERGENCY MEDICINE

## 2021-12-05 PROCEDURE — 81003 URINALYSIS AUTO W/O SCOPE: CPT | Performed by: EMERGENCY MEDICINE

## 2021-12-05 PROCEDURE — 96365 THER/PROPH/DIAG IV INF INIT: CPT

## 2021-12-05 PROCEDURE — 25010000002 KETOROLAC TROMETHAMINE PER 15 MG: Performed by: EMERGENCY MEDICINE

## 2021-12-05 PROCEDURE — 86140 C-REACTIVE PROTEIN: CPT | Performed by: EMERGENCY MEDICINE

## 2021-12-05 PROCEDURE — 87081 CULTURE SCREEN ONLY: CPT | Performed by: EMERGENCY MEDICINE

## 2021-12-05 PROCEDURE — U0003 INFECTIOUS AGENT DETECTION BY NUCLEIC ACID (DNA OR RNA); SEVERE ACUTE RESPIRATORY SYNDROME CORONAVIRUS 2 (SARS-COV-2) (CORONAVIRUS DISEASE [COVID-19]), AMPLIFIED PROBE TECHNIQUE, MAKING USE OF HIGH THROUGHPUT TECHNOLOGIES AS DESCRIBED BY CMS-2020-01-R: HCPCS | Performed by: EMERGENCY MEDICINE

## 2021-12-05 PROCEDURE — 0 IOVERSOL 68 % SOLUTION: Performed by: EMERGENCY MEDICINE

## 2021-12-05 PROCEDURE — 85379 FIBRIN DEGRADATION QUANT: CPT | Performed by: EMERGENCY MEDICINE

## 2021-12-05 PROCEDURE — 84443 ASSAY THYROID STIM HORMONE: CPT | Performed by: EMERGENCY MEDICINE

## 2021-12-05 PROCEDURE — 71275 CT ANGIOGRAPHY CHEST: CPT | Performed by: RADIOLOGY

## 2021-12-05 RX ORDER — DOXYCYCLINE 100 MG/1
100 CAPSULE ORAL 2 TIMES DAILY
Qty: 20 CAPSULE | Refills: 0 | Status: SHIPPED | OUTPATIENT
Start: 2021-12-05

## 2021-12-05 RX ORDER — CEFDINIR 300 MG/1
300 CAPSULE ORAL 2 TIMES DAILY
Qty: 20 CAPSULE | Refills: 0 | Status: SHIPPED | OUTPATIENT
Start: 2021-12-05 | End: 2021-12-15

## 2021-12-05 RX ORDER — SODIUM CHLORIDE 9 MG/ML
125 INJECTION, SOLUTION INTRAVENOUS CONTINUOUS
Status: DISCONTINUED | OUTPATIENT
Start: 2021-12-05 | End: 2021-12-05 | Stop reason: HOSPADM

## 2021-12-05 RX ORDER — DOXYCYCLINE 100 MG/1
100 CAPSULE ORAL ONCE
Status: COMPLETED | OUTPATIENT
Start: 2021-12-05 | End: 2021-12-05

## 2021-12-05 RX ORDER — SODIUM CHLORIDE 0.9 % (FLUSH) 0.9 %
10 SYRINGE (ML) INJECTION AS NEEDED
Status: DISCONTINUED | OUTPATIENT
Start: 2021-12-05 | End: 2021-12-05 | Stop reason: HOSPADM

## 2021-12-05 RX ORDER — KETOROLAC TROMETHAMINE 30 MG/ML
30 INJECTION, SOLUTION INTRAMUSCULAR; INTRAVENOUS ONCE
Status: COMPLETED | OUTPATIENT
Start: 2021-12-05 | End: 2021-12-05

## 2021-12-05 RX ADMIN — KETOROLAC TROMETHAMINE 30 MG: 30 INJECTION, SOLUTION INTRAMUSCULAR; INTRAVENOUS at 09:19

## 2021-12-05 RX ADMIN — SODIUM CHLORIDE 500 ML: 9 INJECTION, SOLUTION INTRAVENOUS at 09:20

## 2021-12-05 RX ADMIN — SODIUM CHLORIDE 2 G: 9 INJECTION, SOLUTION INTRAVENOUS at 12:47

## 2021-12-05 RX ADMIN — DOXYCYCLINE 100 MG: 100 CAPSULE ORAL at 16:14

## 2021-12-05 RX ADMIN — SODIUM CHLORIDE 125 ML/HR: 9 INJECTION, SOLUTION INTRAVENOUS at 09:19

## 2021-12-05 RX ADMIN — IOVERSOL 70 ML: 678 INJECTION INTRA-ARTERIAL; INTRAVENOUS at 12:48

## 2021-12-05 NOTE — ED PROVIDER NOTES
Subjective   Patient is a 49-year-old female who complains the onset yesterday of fever, generalized myalgias, generalized throbbing headache, nonproductive cough, shortness of breath.  She denies chest pain, hemoptysis, abdominal pain, vomiting, diarrhea, other symptoms or other complaints.  She reports that she had her first Pfizer Covid vaccine, did not go back for the second.          Review of Systems   All other systems reviewed and are negative.      Past Medical History:   Diagnosis Date   • Anxiety    • Arthritis    • Asthma    • Back injury    • Depression    • Disease of thyroid gland    • Elevated cholesterol    • Hypertension    • Hypothyroid    • Myalgia    • Neck injury    • Neuropathy    • Panic attacks    • Seizures (HCC)    • Sleep apnea     uses c-pap   • Vertigo, benign paroxysmal        No Known Allergies    Past Surgical History:   Procedure Laterality Date   • ABDOMINAL SURGERY     • BACK SURGERY      with instrumentation   • CARDIAC CATHETERIZATION     • CARPAL TUNNEL RELEASE Right 1/21/2021    Procedure: RIGHT CARPAL TUNNEL RELEASE;  Surgeon: Fitz Curry MD;  Location: [facility];  Service: Orthopedics;  Laterality: Right;   • CARPAL TUNNEL RELEASE Left 4/1/2021    Procedure: LEFT CARPAL TUNNEL RELEASE;  Surgeon: Fitz Curry MD;  Location: [facility];  Service: Orthopedics;  Laterality: Left;   • CHOLECYSTECTOMY     • DEQUERVAIN RELEASE Left 10/26/2021    Procedure: LEFT DEQUERVAIN RELEASE FIRST DORSAL WRIST COMPARTMENT;  Surgeon: Fitz Curry MD;  Location: [facility];  Service: Orthopedics;  Laterality: Left;   • HAND SURGERY     • NECK SURGERY      with instrumentation       Family History   Problem Relation Age of Onset   • Stroke Mother    • Heart attack Mother    • Heart failure Father    • Diabetes Father    • Heart failure Brother        Social History     Socioeconomic History   • Marital status:    Tobacco Use   • Smoking status: Former  Smoker     Packs/day: 1.00     Years: 9.00     Pack years: 9.00     Types: Cigarettes   • Smokeless tobacco: Never Used   Vaping Use   • Vaping Use: Never used   Substance and Sexual Activity   • Alcohol use: No   • Drug use: No   • Sexual activity: Defer     Birth control/protection: None           Objective   Physical Exam  Vitals and nursing note reviewed.   Constitutional:       General: She is not in acute distress.     Appearance: Normal appearance. She is well-developed. She is ill-appearing. She is not toxic-appearing or diaphoretic.      Comments: Pleasant female, appears ill but nontoxic.  She does not appear to be in acute distress.   HENT:      Head: Normocephalic and atraumatic.   Eyes:      General: No scleral icterus.     Pupils: Pupils are equal, round, and reactive to light.   Neck:      Trachea: No tracheal deviation.   Cardiovascular:      Rate and Rhythm: Regular rhythm.   Pulmonary:      Effort: Pulmonary effort is normal. No respiratory distress.      Breath sounds: Normal breath sounds.   Chest:      Chest wall: No tenderness.   Abdominal:      General: Bowel sounds are normal.      Palpations: Abdomen is soft.      Tenderness: There is no abdominal tenderness. There is no guarding or rebound.   Musculoskeletal:         General: No tenderness. Normal range of motion.      Cervical back: Normal range of motion and neck supple. No rigidity or tenderness.      Right lower leg: No tenderness. No edema.      Left lower leg: No tenderness. No edema.   Skin:     General: Skin is warm and dry.      Capillary Refill: Capillary refill takes less than 2 seconds.      Coloration: Skin is not pale.   Neurological:      General: No focal deficit present.      Mental Status: She is alert and oriented to person, place, and time.      GCS: GCS eye subscore is 4. GCS verbal subscore is 5. GCS motor subscore is 6.      Motor: No abnormal muscle tone.   Psychiatric:         Mood and Affect: Mood normal.          Behavior: Behavior normal.         Procedures  CT Chest Pulmonary Embolism   Final Result   1. No evidence of a pulmonary embolus   2. There are groundglass changes bilaterally which may represent   congestion or pneumonia   3. Borderline lymph node in the right axilla..       This report was finalized on 12/5/2021 12:52 PM by Dr. Elmer Lara MD.          XR Chest 1 View   Final Result   #1 there is a patchy infiltrate in the left lower lobe and there are mildly increased markings in the right inferior perihilar region. There may be mildly increased markings in the right upper lobe. Findings are consistent with provided clinical history   of pneumonia. Follow-up to complete clearing is recommended.   2. Mild cardiac silhouette enlargement. Study is limited by the patient's obesity.      Signer Name: Rosemarie Webb MD    Signed: 12/5/2021 9:46 AM    Workstation Name: [email or web address]     [facility]      Results for orders placed during the hospital encounter of 06/06/19    Adult Transthoracic Echo Complete W/ Cont if Necessary Per Protocol (With Agitated Saline)    Interpretation Summary  · Left ventricular systolic function is normal.  · Saline test results are negative for right to left atrial level shunt.  · Estimated EF appears to be in the range of 56 - 60%.    Results for orders placed or performed during the hospital encounter of 12/05/21   Rapid Strep A Screen - Swab, Throat    Specimen: Throat; Swab   Result Value Ref Range    Strep A Ag Negative Negative   Respiratory Panel PCR w/COVID-19(SARS-CoV-2) FARZAD/BELKIS/SHRUTHI/PAD/COR/MAD/LOIS In-House, NP Swab in UTM/VTM, 3-4 HR TAT - Swab, Nasopharynx    Specimen: Nasopharynx; Swab   Result Value Ref Range    ADENOVIRUS, PCR Not Detected Not Detected    Coronavirus 229E Not Detected Not Detected    Coronavirus HKU1 Not Detected Not Detected    Coronavirus NL63 Not Detected Not Detected    Coronavirus OC43 Not Detected Not Detected    COVID19 Not  Detected Not Detected - Ref. Range    Human Metapneumovirus Not Detected Not Detected    Human Rhinovirus/Enterovirus Not Detected Not Detected    Influenza A PCR Not Detected Not Detected    Influenza B PCR Not Detected Not Detected    Parainfluenza Virus 1 Not Detected Not Detected    Parainfluenza Virus 2 Not Detected Not Detected    Parainfluenza Virus 3 Not Detected Not Detected    Parainfluenza Virus 4 Not Detected Not Detected    RSV, PCR Not Detected Not Detected    Bordetella pertussis pcr Not Detected Not Detected    Bordetella parapertussis PCR Not Detected Not Detected    Chlamydophila pneumoniae PCR Not Detected Not Detected    Mycoplasma pneumo by PCR Not Detected Not Detected   COVID-19,CEPHEID/SARAH/BDMAX,COR/SHRUTHI/PAD/JANELL IN-HOUSE(OR EMERGENT/ADD-ON),NP SWAB IN TRANSPORT MEDIA 3-4 HR TAT, RT-PCR - Swab, Nasopharynx    Specimen: Nasopharynx; Swab   Result Value Ref Range    COVID19 Not Detected Not Detected - Ref. Range   Comprehensive Metabolic Panel    Specimen: Blood   Result Value Ref Range    Glucose 133 (H) 65 - 99 mg/dL    BUN 11 6 - 20 mg/dL    Creatinine 0.67 0.57 - 1.00 mg/dL    Sodium 141 136 - 145 mmol/L    Potassium 4.1 3.5 - 5.2 mmol/L    Chloride 104 98 - 107 mmol/L    CO2 22.1 22.0 - 29.0 mmol/L    Calcium 9.6 8.6 - 10.5 mg/dL    Total Protein 7.5 6.0 - 8.5 g/dL    Albumin 4.06 3.50 - 5.20 g/dL    ALT (SGPT) 35 (H) 1 - 33 U/L    AST (SGOT) 31 1 - 32 U/L    Alkaline Phosphatase 108 39 - 117 U/L    Total Bilirubin <0.2 0.0 - 1.2 mg/dL    eGFR Non African Amer 92 >60 mL/min/1.73    Globulin 3.4 gm/dL    A/G Ratio 1.2 g/dL    BUN/Creatinine Ratio 16.4 7.0 - 25.0    Anion Gap 14.9 5.0 - 15.0 mmol/L   hCG, Serum, Qualitative    Specimen: Blood   Result Value Ref Range    HCG Qualitative Negative Negative   Urinalysis With Culture If Indicated - Urine, Clean Catch    Specimen: Urine, Clean Catch   Result Value Ref Range    Color, UA Yellow Yellow, Straw    Appearance, UA Clear Clear    pH, UA  6.0 5.0 - 8.0    Specific Gravity, UA 1.009 1.005 - 1.030    Glucose, UA Negative Negative    Ketones, UA Negative Negative    Bilirubin, UA Negative Negative    Blood, UA Negative Negative    Protein, UA Negative Negative    Leuk Esterase, UA Negative Negative    Nitrite, UA Negative Negative    Urobilinogen, UA 0.2 E.U./dL 0.2 - 1.0 E.U./dL   Troponin    Specimen: Blood   Result Value Ref Range    Troponin T <0.010 0.000 - 0.030 ng/mL   C-reactive Protein    Specimen: Blood   Result Value Ref Range    C-Reactive Protein 1.62 (H) 0.00 - 0.50 mg/dL   TSH    Specimen: Blood   Result Value Ref Range    TSH 3.530 0.270 - 4.200 uIU/mL   Magnesium    Specimen: Blood   Result Value Ref Range    Magnesium 1.8 1.6 - 2.6 mg/dL   CBC Auto Differential    Specimen: Blood   Result Value Ref Range    WBC 7.59 3.40 - 10.80 10*3/mm3    RBC 5.24 3.77 - 5.28 10*6/mm3    Hemoglobin 15.1 12.0 - 15.9 g/dL    Hematocrit 46.2 34.0 - 46.6 %    MCV 88.2 79.0 - 97.0 fL    MCH 28.8 26.6 - 33.0 pg    MCHC 32.7 31.5 - 35.7 g/dL    RDW 15.0 12.3 - 15.4 %    RDW-SD 49.2 37.0 - 54.0 fl    MPV 10.0 6.0 - 12.0 fL    Platelets 387 140 - 450 10*3/mm3    Neutrophil % 61.3 42.7 - 76.0 %    Lymphocyte % 17.4 (L) 19.6 - 45.3 %    Monocyte % 16.1 (H) 5.0 - 12.0 %    Eosinophil % 2.5 0.3 - 6.2 %    Basophil % 1.1 0.0 - 1.5 %    Immature Grans % 1.6 (H) 0.0 - 0.5 %    Neutrophils, Absolute 4.66 1.70 - 7.00 10*3/mm3    Lymphocytes, Absolute 1.32 0.70 - 3.10 10*3/mm3    Monocytes, Absolute 1.22 (H) 0.10 - 0.90 10*3/mm3    Eosinophils, Absolute 0.19 0.00 - 0.40 10*3/mm3    Basophils, Absolute 0.08 0.00 - 0.20 10*3/mm3    Immature Grans, Absolute 0.12 (H) 0.00 - 0.05 10*3/mm3    nRBC 0.0 0.0 - 0.2 /100 WBC   D-dimer, Quantitative    Specimen: Blood   Result Value Ref Range    D-Dimer, Quantitative 0.62 (C) 0.00 - 0.50 MCGFEU/mL   Lactic Acid, Plasma    Specimen: Arm, Right; Blood   Result Value Ref Range    Lactate 1.1 0.5 - 2.0 mmol/L   ECG 12 Lead   Result  Value Ref Range    QT Interval 368 ms    QTC Interval 457 ms   Green Top (Gel)   Result Value Ref Range    Extra Tube Hold for add-ons.    Lavender Top   Result Value Ref Range    Extra Tube hold for add-on    Gold Top - SST   Result Value Ref Range    Extra Tube Hold for add-ons.    Light Blue Top   Result Value Ref Range    Extra Tube hold for add-on                   ED Course  ED Course as of 12/05/21 1729   Sun Dec 05, 2021   0845 Currently on the monitor, heart rate 92, pulse ox 97% on room air, blood pressure 128/87.  I feel that most likely the original blood pressure reading was erroneous. [CM]   1019 EKG was performed at 0903.  This shows sinus rhythm, rate 93.  HI interval 156, QRS duration 86, QTc 457 ms.  Evidence of septal infarct, age indeterminant.  Nonspecific ST-T changes.  No evidence for STEMI.  These changes are new compared with 3/21/2021. [CM]   1216 Patient is doing well, feels better, states her headache had gone away completely, starting to come back somewhat.  We discussed her test results to this point.  We have decided to do a second Covid test to confirm or deny. [CM]   1428 Cepheid Covid is negative as well.  Patient is doing well.  We discussed her test results and her plan of care.  She voices understanding and agreement. [CM]      ED Course User Index  [CM] Mark Lauren MD                                                 [facility]    Final diagnoses:   Pneumonia due to infectious organism, unspecified laterality, unspecified part of lung       ED Disposition  ED Disposition     ED Disposition Condition Comment    Discharge Stable             Please note that portions of this note were completed with a voice recognition program.          Mark Lauren MD  12/05/21 1723

## 2021-12-05 NOTE — DISCHARGE INSTRUCTIONS
Home to rest.  Drink lots of fluids.  Antibiotics as prescribed.  Tylenol/ibuprofen/Robitussin-DM as directed as needed.  See your family doctor in the office in 2 days.  Return to the emergency department right away if symptoms worsen/any problems.

## 2021-12-07 LAB — BACTERIA SPEC AEROBE CULT: NORMAL

## 2021-12-10 LAB
BACTERIA SPEC AEROBE CULT: NORMAL
BACTERIA SPEC AEROBE CULT: NORMAL

## 2022-01-19 ENCOUNTER — OFFICE VISIT (OUTPATIENT)
Dept: ORTHOPEDIC SURGERY | Facility: CLINIC | Age: 52
End: 2022-01-19

## 2022-01-19 VITALS — WEIGHT: 275.57 LBS | BODY MASS INDEX: 43.25 KG/M2 | HEIGHT: 67 IN

## 2022-01-19 DIAGNOSIS — M65.4 RADIAL STYLOID TENOSYNOVITIS (DE QUERVAIN): Primary | ICD-10-CM

## 2022-01-19 DIAGNOSIS — Z09 POSTOP CHECK: ICD-10-CM

## 2022-01-19 DIAGNOSIS — Z98.890 S/P CARPAL TUNNEL RELEASE: ICD-10-CM

## 2022-01-19 PROCEDURE — 99024 POSTOP FOLLOW-UP VISIT: CPT | Performed by: ORTHOPAEDIC SURGERY

## 2022-01-19 NOTE — PROGRESS NOTES
Follow-up Visit         Patient: Andres Cortez  YOB: 1970  Date of Encounter: 01/19/2022      Chief  Complaint:   Chief Complaint   Patient presents with   • Left Thumb - Numbness, Follow-up     10/26/21 (12w 1d)     Fitz Curry MD    Left Dequervain Release First Dorsal Wrist Compartment - Left               HPI:  Andres Cortez, 51 y.o. female presents in follow-up left thumb pain and numbness complaining of frequently dropping things.  She has undergone bilateral carpal tunnel releases.  She most recently underwent release first dorsal wrist compartment for de Quervain's tenosynovitis.  She no longer experiences popping sensation in her thumb or pain with flexion.      Medical History:  Patient Active Problem List   Diagnosis   • Chest pain   • Shortness of breath   • Essential hypertension   • Abnormal stress test   • Lower extremity edema   • Disease of thyroid gland   • Former tobacco use   • Vertigo   • Palpitations   • Carpal tunnel syndrome, bilateral   • Carpal tunnel syndrome, right   • S/P carpal tunnel release   • Carpal tunnel syndrome, left   • Radial styloid tenosynovitis (de quervain)     Past Medical History:   Diagnosis Date   • Anxiety    • Arthritis    • Asthma    • Back injury    • Depression    • Disease of thyroid gland    • Elevated cholesterol    • Hypertension    • Hypothyroid    • Myalgia    • Neck injury    • Neuropathy    • Panic attacks    • Seizures (HCC)    • Sleep apnea     uses c-pap   • Vertigo, benign paroxysmal          Social History:  Social History     Socioeconomic History   • Marital status:    Tobacco Use   • Smoking status: Former Smoker     Packs/day: 1.00     Years: 9.00     Pack years: 9.00     Types: Cigarettes   • Smokeless tobacco: Never Used   Vaping Use   • Vaping Use: Never used   Substance and Sexual Activity   • Alcohol use: No   • Drug use: No   • Sexual activity: Defer     Birth control/protection: None         Current  Medications:    Current Outpatient Medications:   •  aspirin 81 MG tablet, Take 1 tablet by mouth Daily., Disp: 30 tablet, Rfl: 6  •  atorvastatin (LIPITOR) 10 MG tablet, Take 10 mg by mouth Daily., Disp: , Rfl:   •  clonazePAM (KlonoPIN) 0.5 MG tablet, Take 0.5 mg by mouth 3 (Three) Times a Day As Needed., Disp: , Rfl:   •  CloNIDine (CATAPRES) 0.1 MG tablet, Take 0.05-0.1 mg by mouth 2 (Two) Times a Day., Disp: , Rfl:   •  doxycycline (MONODOX) 100 MG capsule, Take 1 capsule by mouth 2 (Two) Times a Day., Disp: 20 capsule, Rfl: 0  •  DULoxetine (CYMBALTA) 60 MG capsule, Take 90 mg by mouth Daily., Disp: , Rfl:   •  furosemide (LASIX) 20 MG tablet, Take 1 tablet by mouth 2 (Two) Times a Day., Disp: 60 tablet, Rfl: 6  •  gemfibrozil (LOPID) 600 MG tablet, Take 600 mg by mouth Daily., Disp: , Rfl:   •  levothyroxine (SYNTHROID, LEVOTHROID) 100 MCG tablet, Take 112 mcg by mouth Daily., Disp: , Rfl:   •  meclizine (ANTIVERT) 25 MG tablet, Take 1 tablet by mouth 3 (Three) Times a Day As Needed for dizziness., Disp: 30 tablet, Rfl: 0  •  meloxicam (MOBIC) 15 MG tablet, Take 15 mg by mouth every night at bedtime., Disp: , Rfl:   •  metoprolol succinate XL (TOPROL-XL) 50 MG 24 hr tablet, Take 50 mg by mouth Every Night., Disp: , Rfl:   •  montelukast (SINGULAIR) 10 MG tablet, Take 10 mg by mouth Every Night., Disp: , Rfl:   •  olmesartan (BENICAR) 40 MG tablet, Take 40 mg by mouth Daily., Disp: , Rfl:   •  Omega-3 Fatty Acids (fish oil) 1000 MG capsule capsule, Take 1,000 mg by mouth 2 (Two) Times a Day With Meals., Disp: , Rfl:   •  OXcarbazepine (TRILEPTAL) 300 MG tablet, Take 600 mg by mouth 3 (Three) Times a Day As Needed., Disp: , Rfl:   •  potassium chloride (MICRO-K) 10 MEQ CR capsule, 10 mEq Daily., Disp: , Rfl:   •  pregabalin (LYRICA) 300 MG capsule, Take 200 mg by mouth 2 (Two) Times a Day., Disp: , Rfl:   •  promethazine (PHENERGAN) 25 MG tablet, TAKE 1 TABLET BY MOUTH EVERY 4 TO 6 HOURS AS NEEDED FORNAUSEA AND  VOMITING, Disp: , Rfl:   •  tiZANidine (ZANAFLEX) 4 MG tablet, Take 40 mg by mouth 3 (Three) Times a Day As Needed., Disp: , Rfl:   •  traMADol (ULTRAM) 50 MG tablet, Take 50 mg by mouth Every 6 (Six) Hours As Needed for Moderate Pain ., Disp: , Rfl:   •  vitamin D3 125 MCG (5000 UT) capsule capsule, Take 1 capsule by mouth Daily., Disp: , Rfl:       Allergies:  No Known Allergies      Family History:  Family History   Problem Relation Age of Onset   • Stroke Mother    • Heart attack Mother    • Heart failure Father    • Diabetes Father    • Heart failure Brother          Surgical History:  Past Surgical History:   Procedure Laterality Date   • ABDOMINAL SURGERY     • BACK SURGERY      with instrumentation   • CARDIAC CATHETERIZATION     • CARPAL TUNNEL RELEASE Right 1/21/2021    Procedure: RIGHT CARPAL TUNNEL RELEASE;  Surgeon: Fitz Curry MD;  Location: [facility];  Service: Orthopedics;  Laterality: Right;   • CARPAL TUNNEL RELEASE Left 4/1/2021    Procedure: LEFT CARPAL TUNNEL RELEASE;  Surgeon: Fitz Curry MD;  Location: [facility];  Service: Orthopedics;  Laterality: Left;   • CHOLECYSTECTOMY     • DEQUERVAIN RELEASE Left 10/26/2021    Procedure: LEFT DEQUERVAIN RELEASE FIRST DORSAL WRIST COMPARTMENT;  Surgeon: Fitz Curry MD;  Location: [facility];  Service: Orthopedics;  Laterality: Left;   • HAND SURGERY     • NECK SURGERY      with instrumentation         Examination:   Examination thumb reveals full motion without triggering or pain in her wrist.  She describes diminished sensation along the border of her thumb normal sensation to the dorsal aspect of her thumb and tip of her thumb she has normal sensation to the tips of her fingers.        Assessment & Plan:   51 y.o. female presents status post de Quervain's release left wrist and carpal tunnel release left hand overall doing well.  She will follow-up in 6 months if symptoms at that time justify.         Diagnosis Plan    1. Radial styloid tenosynovitis (de quervain)     2. S/P carpal tunnel release     3. Postop check               Cc:  Katlyn Cavanaugh, PATRICIA              This document has been electronically signed by Fitz Curry MD   January 20, 2022 17:22 EST

## 2024-11-18 NOTE — THERAPY DISCHARGE NOTE
Acute Care - Occupational Therapy Discharge Summary  [facility]     Patient Name: Andres Cortez  : 1970  MRN: [id number]    Today's Date: 6/10/2019  Onset of Illness/Injury or Date of Surgery: 19    Date of Referral to OT: 19  Referring Physician: MD Karan      Admit Date: 2019        OT Recommendation and Plan    Visit Dx:    ICD-10-CM ICD-9-CM   1. Intractable vomiting with nausea, unspecified vomiting type R11.2 536.2   2. Vertigo R42 780.4   3. Nonintractable headache, unspecified chronicity pattern, unspecified headache type R51 784.0   4. Impaired mobility and ADLs Z74.09 799.89   5. Bilateral carpal tunnel syndrome G56.03 354.0               Rehab Goal Summary     Row Name 06/10/19 1436             Transfer Goal 1 (OT)    Progress/Outcome (Transfer Goal 1, OT)  goal not met;discharged from facility  -GÓMEZ         Dressing Goal 1 (OT)    Progress/Outcome (Dressing Goal 1, OT)  goal not met;discharged from facility  -GÓMEZ         Toileting Goal 1 (OT)    Progress/Outcome (Toileting Goal 1, OT)  goal not met;discharged from facility  -GÓMEZ         Safety Awareness Goal 1 (OT)    Progress/Outcome (Safety Awareness Goal 1, OT)  goal not met;discharged from facility  -GÓMEZ        User Key  (r) = Recorded By, (t) = Taken By, (c) = Cosigned By    Initials Name Provider Type Discipline    Sera Bragg, OT Occupational Therapist OT              Therapy Suggested Charges     Code   Minutes Charges    None                 OT Discharge Summary  Anticipated Discharge Disposition (OT): home with home health(1-2 visist  OT for home safety evaluation and recommenda.)  Reason for Discharge: Discharge from facility  Outcomes Achieved: Discharge from facility occurred on same date as evluation  Discharge Destination: Home with assist      Sera Mendoza OT  6/10/2019    No

## (undated) DEVICE — GLV SURG PREMIERPRO MIC LTX PF SZ8 BRN

## (undated) DEVICE — PATIENT RETURN ELECTRODE, SINGLE-USE, CONTACT QUALITY MONITORING, ADULT, WITH 9FT CORD, FOR PATIENTS WEIGING OVER 33LBS. (15KG): Brand: MEGADYNE

## (undated) DEVICE — DRSNG WND GZ CURAD OIL EMULSION 3X3IN STRL

## (undated) DEVICE — GLV SURG TRIUMPH MICRO PF LTX 8 STRL

## (undated) DEVICE — HOLDER: Brand: DEROYAL

## (undated) DEVICE — CUFF TOURNI XPRESAIR/ADH LEG STD 24X4IN

## (undated) DEVICE — CUFF TOURNI 1BLADDER 1PRT 24IN STRL

## (undated) DEVICE — SUT ETHLN 3/0 FS1 663G

## (undated) DEVICE — PENCL ES MEGADINE EZ/CLEAN BUTN W/HOLSTR 10FT

## (undated) DEVICE — BNDG ELAS MATRX V/CLS 2INX5YD LF

## (undated) DEVICE — PK EXTREM UPPR 70

## (undated) DEVICE — APPL CHLORAPREP HI/LITE 26ML ORNG

## (undated) DEVICE — BNDG ELAS MATRX  2IN 5YD LF STRL